# Patient Record
Sex: FEMALE | Race: WHITE | NOT HISPANIC OR LATINO | Employment: FULL TIME | URBAN - METROPOLITAN AREA
[De-identification: names, ages, dates, MRNs, and addresses within clinical notes are randomized per-mention and may not be internally consistent; named-entity substitution may affect disease eponyms.]

---

## 2017-05-23 DIAGNOSIS — I10 ESSENTIAL HYPERTENSION: ICD-10-CM

## 2017-05-23 RX ORDER — BENAZEPRIL HYDROCHLORIDE 10 MG/1
TABLET ORAL
Qty: 90 TABLET | Refills: 2 | Status: SHIPPED | OUTPATIENT
Start: 2017-05-23 | End: 2018-05-07 | Stop reason: SDUPTHER

## 2017-07-17 DIAGNOSIS — I10 ESSENTIAL HYPERTENSION: ICD-10-CM

## 2017-07-17 RX ORDER — ALENDRONATE SODIUM 70 MG/1
TABLET ORAL
Qty: 4 TABLET | Refills: 0 | Status: SHIPPED | OUTPATIENT
Start: 2017-07-17 | End: 2017-08-04 | Stop reason: SDUPTHER

## 2017-07-17 RX ORDER — BUPROPION HYDROCHLORIDE 150 MG/1
TABLET, EXTENDED RELEASE ORAL
Qty: 60 TABLET | Refills: 0 | Status: SHIPPED | OUTPATIENT
Start: 2017-07-17 | End: 2017-08-04 | Stop reason: SDUPTHER

## 2017-07-17 RX ORDER — CITALOPRAM 40 MG/1
TABLET, FILM COATED ORAL
Qty: 30 TABLET | Refills: 0 | Status: SHIPPED | OUTPATIENT
Start: 2017-07-17 | End: 2017-08-04 | Stop reason: SDUPTHER

## 2017-07-17 RX ORDER — GABAPENTIN 300 MG/1
CAPSULE ORAL
Qty: 90 CAPSULE | Refills: 0 | Status: SHIPPED | OUTPATIENT
Start: 2017-07-17 | End: 2018-11-23 | Stop reason: SDUPTHER

## 2017-08-04 ENCOUNTER — OFFICE VISIT (OUTPATIENT)
Dept: FAMILY MEDICINE | Facility: CLINIC | Age: 67
End: 2017-08-04
Payer: MEDICARE

## 2017-08-04 ENCOUNTER — LAB VISIT (OUTPATIENT)
Dept: LAB | Facility: HOSPITAL | Age: 67
End: 2017-08-04
Attending: FAMILY MEDICINE
Payer: MEDICARE

## 2017-08-04 VITALS
SYSTOLIC BLOOD PRESSURE: 118 MMHG | OXYGEN SATURATION: 98 % | TEMPERATURE: 98 F | HEART RATE: 73 BPM | HEIGHT: 62 IN | BODY MASS INDEX: 23.32 KG/M2 | RESPIRATION RATE: 12 BRPM | DIASTOLIC BLOOD PRESSURE: 62 MMHG | WEIGHT: 126.75 LBS

## 2017-08-04 DIAGNOSIS — I10 ESSENTIAL HYPERTENSION: ICD-10-CM

## 2017-08-04 DIAGNOSIS — Z12.31 ENCOUNTER FOR SCREENING MAMMOGRAM FOR BREAST CANCER: ICD-10-CM

## 2017-08-04 DIAGNOSIS — E78.5 HYPERLIPIDEMIA, UNSPECIFIED HYPERLIPIDEMIA TYPE: ICD-10-CM

## 2017-08-04 DIAGNOSIS — Z12.11 COLON CANCER SCREENING: ICD-10-CM

## 2017-08-04 DIAGNOSIS — R25.2 SPASM: ICD-10-CM

## 2017-08-04 DIAGNOSIS — M94.9 DISORDER OF BONE AND ARTICULAR CARTILAGE: ICD-10-CM

## 2017-08-04 DIAGNOSIS — F41.8 DEPRESSION WITH ANXIETY: ICD-10-CM

## 2017-08-04 DIAGNOSIS — Z00.00 WELL ADULT EXAM: Primary | ICD-10-CM

## 2017-08-04 DIAGNOSIS — M89.9 DISORDER OF BONE AND ARTICULAR CARTILAGE: ICD-10-CM

## 2017-08-04 DIAGNOSIS — Z00.00 WELL ADULT EXAM: ICD-10-CM

## 2017-08-04 LAB
ALBUMIN SERPL BCP-MCNC: 3.9 G/DL
ALP SERPL-CCNC: 77 U/L
ALT SERPL W/O P-5'-P-CCNC: 11 U/L
ANION GAP SERPL CALC-SCNC: 10 MMOL/L
AST SERPL-CCNC: 24 U/L
BILIRUB SERPL-MCNC: 0.2 MG/DL
BUN SERPL-MCNC: 14 MG/DL
CALCIUM SERPL-MCNC: 9.5 MG/DL
CHLORIDE SERPL-SCNC: 102 MMOL/L
CHOLEST/HDLC SERPL: 3.8 {RATIO}
CO2 SERPL-SCNC: 27 MMOL/L
CREAT SERPL-MCNC: 0.9 MG/DL
EST. GFR  (AFRICAN AMERICAN): >60 ML/MIN/1.73 M^2
EST. GFR  (NON AFRICAN AMERICAN): >60 ML/MIN/1.73 M^2
ESTIMATED AVG GLUCOSE: 123 MG/DL
GLUCOSE SERPL-MCNC: 105 MG/DL
HBA1C MFR BLD HPLC: 5.9 %
HDL/CHOLESTEROL RATIO: 26.1 %
HDLC SERPL-MCNC: 245 MG/DL
HDLC SERPL-MCNC: 64 MG/DL
LDLC SERPL CALC-MCNC: 150.4 MG/DL
NONHDLC SERPL-MCNC: 181 MG/DL
POTASSIUM SERPL-SCNC: 3.9 MMOL/L
PROT SERPL-MCNC: 7.4 G/DL
SODIUM SERPL-SCNC: 139 MMOL/L
TRIGL SERPL-MCNC: 153 MG/DL

## 2017-08-04 PROCEDURE — 99397 PER PM REEVAL EST PAT 65+ YR: CPT | Mod: S$GLB,,, | Performed by: FAMILY MEDICINE

## 2017-08-04 PROCEDURE — 80053 COMPREHEN METABOLIC PANEL: CPT

## 2017-08-04 PROCEDURE — 80061 LIPID PANEL: CPT

## 2017-08-04 PROCEDURE — 99999 PR PBB SHADOW E&M-EST. PATIENT-LVL IV: CPT | Mod: PBBFAC,,, | Performed by: FAMILY MEDICINE

## 2017-08-04 PROCEDURE — 83036 HEMOGLOBIN GLYCOSYLATED A1C: CPT

## 2017-08-04 PROCEDURE — 36415 COLL VENOUS BLD VENIPUNCTURE: CPT | Mod: PO

## 2017-08-04 RX ORDER — CYCLOBENZAPRINE HCL 10 MG
10 TABLET ORAL
Qty: 45 TABLET | Refills: 0 | Status: SHIPPED | OUTPATIENT
Start: 2017-08-04 | End: 2018-01-08

## 2017-08-04 RX ORDER — BUPROPION HYDROCHLORIDE 150 MG/1
150 TABLET, EXTENDED RELEASE ORAL 2 TIMES DAILY
Qty: 180 TABLET | Refills: 3 | Status: SHIPPED | OUTPATIENT
Start: 2017-08-04 | End: 2018-11-23 | Stop reason: SDUPTHER

## 2017-08-04 RX ORDER — ALENDRONATE SODIUM 70 MG/1
70 TABLET ORAL WEEKLY
Qty: 12 TABLET | Refills: 2 | Status: SHIPPED | OUTPATIENT
Start: 2017-08-04 | End: 2018-11-23 | Stop reason: SDUPTHER

## 2017-08-04 RX ORDER — CITALOPRAM 40 MG/1
TABLET, FILM COATED ORAL
Qty: 90 TABLET | Refills: 3 | Status: SHIPPED | OUTPATIENT
Start: 2017-08-04 | End: 2018-08-20 | Stop reason: SDUPTHER

## 2017-08-04 RX ORDER — ATORVASTATIN CALCIUM 40 MG/1
TABLET, FILM COATED ORAL
Qty: 90 TABLET | Refills: 3 | Status: SHIPPED | OUTPATIENT
Start: 2017-08-04 | End: 2018-11-23 | Stop reason: SDUPTHER

## 2017-08-04 NOTE — PROGRESS NOTES
Subjective:       Patient ID: Christi Coreas is a 66 y.o. female.    Chief Complaint: Annual Exam    HPI:  Here for annual exam.  Reports severe pain LLE x 3 weeks.    Review of Systems   Constitutional: Negative for appetite change, chills, diaphoresis, fatigue and fever.   HENT: Negative for hearing loss, sinus pressure and trouble swallowing.    Eyes: Negative for visual disturbance.   Respiratory: Negative for cough, chest tightness, shortness of breath and wheezing.    Cardiovascular: Negative for chest pain, palpitations and leg swelling.   Gastrointestinal: Negative for abdominal pain, blood in stool, constipation, diarrhea, nausea and vomiting.   Genitourinary: Negative for difficulty urinating, dysuria, hematuria, menstrual problem, pelvic pain and vaginal discharge.   Musculoskeletal: Negative for back pain, joint swelling and neck pain.   Skin: Negative for rash.   Neurological: Positive for numbness. Negative for dizziness, weakness and headaches.   Hematological: Negative for adenopathy. Does not bruise/bleed easily.   Psychiatric/Behavioral: Negative for dysphoric mood and sleep disturbance. The patient is not nervous/anxious.        Objective:      Physical Exam   Constitutional: She is oriented to person, place, and time. She appears well-developed and well-nourished.   Eyes: No scleral icterus.   Neck: Normal range of motion. Neck supple. No thyromegaly present.   Cardiovascular: Normal rate and regular rhythm.  Exam reveals no gallop and no friction rub.    No murmur heard.  Pulmonary/Chest: Effort normal and breath sounds normal. She has no wheezes. She has no rales.   Abdominal: Soft. Bowel sounds are normal. She exhibits no distension and no mass. There is no hepatosplenomegaly. There is no tenderness.   Musculoskeletal: She exhibits no edema.   Lymphadenopathy:     She has no cervical adenopathy.     She has no axillary adenopathy.        Right: No supraclavicular adenopathy present.         Left: No supraclavicular adenopathy present.   Neurological: She is alert and oriented to person, place, and time.   Skin: Skin is warm and dry. No rash noted.   Psychiatric: She has a normal mood and affect. Her behavior is normal.         Assessment:       1. Well adult exam    2. Hyperlipidemia, unspecified hyperlipidemia type    3. Essential hypertension    4. Spasm    5. Disorder of bone and articular cartilage    6. Encounter for screening mammogram for breast cancer    7. Depression with anxiety    8. Colon cancer screening        Plan:       Well adult exam  -     Discussed emotional healt  -     Hemoglobin A1c; Future; Expected date: 08/04/2017  -     Comprehensive metabolic panel; Future; Expected date: 08/04/2017  -     Lipid panel; Future; Expected date: 08/04/2017    Hyperlipidemia, unspecified hyperlipidemia type  -     atorvastatin (LIPITOR) 40 MG tablet; TAKE ONE TABLET BY MOUTH ONCE DAILY IN THE EVENING  Dispense: 90 tablet; Refill: 3    Essential hypertension  Continue benazepril    Spasm  -     cyclobenzaprine (FLEXERIL) 10 MG tablet; Take 1 tablet (10 mg total) by mouth as needed.  Dispense: 45 tablet; Refill: 0    Disorder of bone and articular cartilage  -     alendronate (FOSAMAX) 70 MG tablet; Take 1 tablet (70 mg total) by mouth once a week.  Dispense: 12 tablet; Refill: 2  -     DXA Bone Density Spine And Hip; Future; Expected date: 08/04/2017    Encounter for screening mammogram for breast cancer  -     Mammo Digital Screening Bilateral With CAD; Future; Expected date: 08/04/2017    Depression with anxiety  -     citalopram (CELEXA) 40 MG tablet; TAKE ONE TABLET BY MOUTH ONCE DAILY  Dispense: 90 tablet; Refill: 3  -     buPROPion (WELLBUTRIN SR) 150 MG TBSR 12 hr tablet; Take 1 tablet (150 mg total) by mouth 2 (two) times daily.  Dispense: 180 tablet; Refill: 3        Return in about 1 year (around 8/4/2018).

## 2017-08-07 ENCOUNTER — TELEPHONE (OUTPATIENT)
Dept: FAMILY MEDICINE | Facility: CLINIC | Age: 67
End: 2017-08-07

## 2017-08-07 NOTE — TELEPHONE ENCOUNTER
----- Message from Dahlia Corea MD sent at 8/5/2017 10:18 PM CDT -----  Cholesterol worse compared to 1 year ago.  Verify if patient taking atorvastatin.

## 2017-08-11 ENCOUNTER — HOSPITAL ENCOUNTER (OUTPATIENT)
Dept: RADIOLOGY | Facility: HOSPITAL | Age: 67
Discharge: HOME | End: 2017-08-11
Attending: FAMILY MEDICINE
Payer: MEDICARE

## 2017-08-11 DIAGNOSIS — Z12.31 ENCOUNTER FOR SCREENING MAMMOGRAM FOR BREAST CANCER: ICD-10-CM

## 2017-08-11 PROCEDURE — 77067 SCR MAMMO BI INCL CAD: CPT | Mod: 26,,, | Performed by: RADIOLOGY

## 2017-08-11 PROCEDURE — 77067 SCR MAMMO BI INCL CAD: CPT | Mod: TC

## 2017-08-11 PROCEDURE — 77063 BREAST TOMOSYNTHESIS BI: CPT | Mod: 26,,, | Performed by: RADIOLOGY

## 2017-08-21 ENCOUNTER — HOSPITAL ENCOUNTER (OUTPATIENT)
Dept: RADIOLOGY | Facility: CLINIC | Age: 67
Discharge: HOME | End: 2017-08-21
Attending: FAMILY MEDICINE
Payer: MEDICARE

## 2017-08-21 DIAGNOSIS — M94.9 DISORDER OF BONE AND ARTICULAR CARTILAGE: ICD-10-CM

## 2017-08-21 DIAGNOSIS — M89.9 DISORDER OF BONE AND ARTICULAR CARTILAGE: ICD-10-CM

## 2017-08-21 PROCEDURE — 77080 DXA BONE DENSITY AXIAL: CPT | Mod: TC,PO

## 2017-08-21 PROCEDURE — 77080 DXA BONE DENSITY AXIAL: CPT | Mod: 26,,, | Performed by: INTERNAL MEDICINE

## 2017-08-28 ENCOUNTER — TELEPHONE (OUTPATIENT)
Dept: FAMILY MEDICINE | Facility: CLINIC | Age: 67
End: 2017-08-28

## 2017-08-28 DIAGNOSIS — M81.0 OSTEOPOROSIS, UNSPECIFIED OSTEOPOROSIS TYPE, UNSPECIFIED PATHOLOGICAL FRACTURE PRESENCE: Primary | ICD-10-CM

## 2017-08-28 NOTE — TELEPHONE ENCOUNTER
----- Message from Leia Gaspar PA-C sent at 8/28/2017  8:03 AM CDT -----  Osteoporosis of hips and spine. Verify how long patient has been on fosamax, if over 5 years needs a drug holiday, repeat in 2 years, Recommend calcium 1200mg and Vitamin D 1,000 units daily for bone health.

## 2017-08-28 NOTE — TELEPHONE ENCOUNTER
Please verify that patient has been taking the medication as scheduled. If so recommend trying different medication (IV infusions) verify if pt ok with this

## 2017-09-01 RX ORDER — ZOLEDRONIC ACID 5 MG/100ML
5 INJECTION, SOLUTION INTRAVENOUS ONCE
Qty: 100 ML | Refills: 0 | Status: SHIPPED | OUTPATIENT
Start: 2017-09-01 | End: 2017-09-01

## 2018-01-08 ENCOUNTER — OFFICE VISIT (OUTPATIENT)
Dept: FAMILY MEDICINE | Facility: CLINIC | Age: 68
End: 2018-01-08
Payer: MEDICARE

## 2018-01-08 VITALS
HEART RATE: 81 BPM | DIASTOLIC BLOOD PRESSURE: 80 MMHG | BODY MASS INDEX: 24.26 KG/M2 | WEIGHT: 131.81 LBS | HEIGHT: 62 IN | OXYGEN SATURATION: 96 % | RESPIRATION RATE: 16 BRPM | SYSTOLIC BLOOD PRESSURE: 144 MMHG | TEMPERATURE: 98 F

## 2018-01-08 DIAGNOSIS — Z12.11 COLON CANCER SCREENING: ICD-10-CM

## 2018-01-08 DIAGNOSIS — Z23 FLU VACCINE NEED: ICD-10-CM

## 2018-01-08 DIAGNOSIS — M54.12 CERVICAL RADICULAR PAIN: Primary | ICD-10-CM

## 2018-01-08 PROCEDURE — 99214 OFFICE O/P EST MOD 30 MIN: CPT | Mod: S$GLB,,, | Performed by: PHYSICIAN ASSISTANT

## 2018-01-08 PROCEDURE — 99999 PR PBB SHADOW E&M-EST. PATIENT-LVL IV: CPT | Mod: PBBFAC,,, | Performed by: PHYSICIAN ASSISTANT

## 2018-01-08 PROCEDURE — 90662 IIV NO PRSV INCREASED AG IM: CPT | Mod: S$GLB,,, | Performed by: FAMILY MEDICINE

## 2018-01-08 PROCEDURE — G0008 ADMIN INFLUENZA VIRUS VAC: HCPCS | Mod: S$GLB,,, | Performed by: FAMILY MEDICINE

## 2018-01-08 PROCEDURE — 96372 THER/PROPH/DIAG INJ SC/IM: CPT | Mod: 59,S$GLB,, | Performed by: FAMILY MEDICINE

## 2018-01-08 RX ORDER — TIZANIDINE 4 MG/1
4 TABLET ORAL EVERY 8 HOURS
Qty: 30 TABLET | Refills: 0 | Status: SHIPPED | OUTPATIENT
Start: 2018-01-08 | End: 2018-01-18

## 2018-01-08 RX ORDER — PREDNISONE 20 MG/1
20 TABLET ORAL DAILY
Qty: 5 TABLET | Refills: 0 | Status: SHIPPED | OUTPATIENT
Start: 2018-01-09 | End: 2018-01-19

## 2018-01-08 NOTE — PROGRESS NOTES
Flu vaccine administered IM to left deltoid.VIS form given. Solu-Medrol 125 mg administered to right deltoid. Patient tolerated well. No complaints. No reaction noted.

## 2018-01-08 NOTE — PROGRESS NOTES
"Subjective:       Patient ID: Christi Coreas is a 67 y.o. female.    Chief Complaint: Shoulder Pain (neck pain radiate to right shoulder x2 weeks )    Neck Pain    This is a new problem. The current episode started 1 to 4 weeks ago (2 weeks). The problem occurs constantly. The pain is associated with nothing. The pain is present in the right side. The quality of the pain is described as aching. The pain is severe. The symptoms are aggravated by bending and position. Associated symptoms include numbness. Pertinent negatives include no fever, headaches or tingling. She has tried muscle relaxants and heat for the symptoms. The treatment provided mild relief.     Review of patient's allergies indicates:   Allergen Reactions    Aspirin     Nexium [esomeprazole magnesium] Other (See Comments)     "bad pain"       Review of Systems   Constitutional: Negative for fever.   Musculoskeletal: Positive for arthralgias and neck pain. Negative for joint swelling.   Neurological: Positive for numbness. Negative for tingling and headaches.       Objective:      Physical Exam   Constitutional: She appears well-developed and well-nourished. No distress.   HENT:   Head: Normocephalic and atraumatic.   Neck: Muscular tenderness present. No spinous process tenderness present. Decreased range of motion present.   Musculoskeletal:        Right shoulder: She exhibits decreased range of motion and tenderness. She exhibits no bony tenderness.        Right hand: Normal strength noted.        Left hand: Normal strength noted.   Skin: Skin is warm and dry. She is not diaphoretic.   Psychiatric: She has a normal mood and affect. Her behavior is normal.   Vitals reviewed.      Assessment:       1. Cervical radicular pain    2. Flu vaccine need    3. Colon cancer screening        Plan:         Christi was seen today for shoulder pain.    Diagnoses and all orders for this visit:    Cervical radicular pain  -     methylPREDNISolone sod suc(PF) " injection 125 mg; Inject 125 mg into the muscle once.  -     predniSONE (DELTASONE) 20 MG tablet; Take 1 tablet (20 mg total) by mouth once daily.  -     tiZANidine (ZANAFLEX) 4 MG tablet; Take 1 tablet (4 mg total) by mouth every 8 (eight) hours.  -     ROM exercises given, advised her to call Thursday if there is no change or worsening   -    MRI from 2011 reviewed by me. Spinal canal stenosis if no change by Thursday may need Repeat MRI    Flu vaccine need  -     Influenza - High Dose (65+) (PF) (IM)    Colon cancer screening  -     Case request GI: COLONOSCOPY

## 2018-05-04 ENCOUNTER — OFFICE VISIT (OUTPATIENT)
Dept: FAMILY MEDICINE | Facility: CLINIC | Age: 68
End: 2018-05-04
Payer: MEDICARE

## 2018-05-04 VITALS
TEMPERATURE: 98 F | WEIGHT: 132.25 LBS | BODY MASS INDEX: 24.34 KG/M2 | HEART RATE: 85 BPM | OXYGEN SATURATION: 96 % | DIASTOLIC BLOOD PRESSURE: 70 MMHG | SYSTOLIC BLOOD PRESSURE: 114 MMHG | HEIGHT: 62 IN

## 2018-05-04 DIAGNOSIS — J40 BRONCHITIS: Primary | ICD-10-CM

## 2018-05-04 PROCEDURE — 99214 OFFICE O/P EST MOD 30 MIN: CPT | Mod: 25,S$GLB,, | Performed by: NURSE PRACTITIONER

## 2018-05-04 PROCEDURE — 99999 PR PBB SHADOW E&M-EST. PATIENT-LVL III: CPT | Mod: PBBFAC,,, | Performed by: NURSE PRACTITIONER

## 2018-05-04 PROCEDURE — 3008F BODY MASS INDEX DOCD: CPT | Mod: S$GLB,,, | Performed by: NURSE PRACTITIONER

## 2018-05-04 PROCEDURE — 3074F SYST BP LT 130 MM HG: CPT | Mod: S$GLB,,, | Performed by: NURSE PRACTITIONER

## 2018-05-04 PROCEDURE — 96372 THER/PROPH/DIAG INJ SC/IM: CPT | Mod: S$GLB,,, | Performed by: NURSE PRACTITIONER

## 2018-05-04 PROCEDURE — 3078F DIAST BP <80 MM HG: CPT | Mod: S$GLB,,, | Performed by: NURSE PRACTITIONER

## 2018-05-04 RX ORDER — PROMETHAZINE HYDROCHLORIDE AND DEXTROMETHORPHAN HYDROBROMIDE 6.25; 15 MG/5ML; MG/5ML
5 SYRUP ORAL
Qty: 180 ML | Refills: 0 | Status: SHIPPED | OUTPATIENT
Start: 2018-05-04 | End: 2018-05-14

## 2018-05-04 RX ORDER — LEVOCETIRIZINE DIHYDROCHLORIDE 5 MG/1
5 TABLET, FILM COATED ORAL NIGHTLY
Qty: 30 TABLET | Refills: 0 | Status: SHIPPED | OUTPATIENT
Start: 2018-05-04 | End: 2020-10-15 | Stop reason: SDUPTHER

## 2018-05-04 RX ORDER — DEXAMETHASONE SODIUM PHOSPHATE 4 MG/ML
8 INJECTION, SOLUTION INTRA-ARTICULAR; INTRALESIONAL; INTRAMUSCULAR; INTRAVENOUS; SOFT TISSUE
Status: COMPLETED | OUTPATIENT
Start: 2018-05-04 | End: 2018-05-04

## 2018-05-04 RX ADMIN — DEXAMETHASONE SODIUM PHOSPHATE 8 MG: 4 INJECTION, SOLUTION INTRA-ARTICULAR; INTRALESIONAL; INTRAMUSCULAR; INTRAVENOUS; SOFT TISSUE at 11:05

## 2018-05-04 NOTE — PATIENT INSTRUCTIONS
What Is Acute Bronchitis?  Acute bronchitis is when the airways in your lungs (bronchial tubes) become red and swollen (inflamed). It is usually caused by a viral infection. But it can also occur because of a bacteria or allergen. Symptoms include a cough that produces yellow or greenish mucus and can last for days or sometimes weeks.  Inside healthy lungs    Air travels in and out of the lungs through the airways. The linings of these airways produce sticky mucus. This mucus traps particles that enter the lungs. Tiny structures called cilia then sweep the particles out of the airways.     Healthy airway: Airways are normally open. Air moves in and out easily.      Healthy cilia: Tiny, hairlike cilia sweep mucus and particles up and out of the airways.   Lungs with bronchitis  Bronchitis often occurs with a cold or the flu virus. The airways become inflamed (red and swollen). There is a deep hacking cough from the extra mucus. Other symptoms may include:  · Wheezing  · Chest discomfort  · Shortness of breath  · Mild fever  A second infection, this time due to bacteria, may then occur. And airways irritated by allergens or smoke are more likely to get infected.        Inflamed airway: Inflammation and extra mucus narrow the airway, causing shortness of breath.      Impaired cilia: Extra mucus impairs cilia, causing congestion and wheezing. Smoking makes the problem worse.   Making a diagnosis  A physical exam, health history, and certain tests help your healthcare provider make the diagnosis.  Health history  Your healthcare provider will ask you about your symptoms.  The exam  Your provider listens to your chest for signs of congestion. He or she may also check your ears, nose, and throat.  Possible tests  · A sputum test for bacteria. This requires a sample of mucus from your lungs.  · A nasal or throat swab. This tests to see if you have a bacterial infection.  · A chest X-ray. This is done if your  healthcare provider thinks you have pneumonia.  · Tests to check for an underlying condition. Other tests may be done to check for things such as allergies, asthma, or COPD (chronic obstructive pulmonary disease). You may need to see a specialist for more lung function testing.  Treating a cough  The main treatment for bronchitis is easing symptoms. Avoiding smoke, allergens, and other things that trigger coughing can often help. If the infection is bacterial, you may be given antibiotics. During the illness, it's important to get plenty of sleep. To ease symptoms:  · Dont smoke. Also avoid secondhand smoke.  · Use a humidifier. Or try breathing in steam from a hot shower. This may help loosen mucus.  · Drink a lot of water and juice. They can soothe the throat and may help thin mucus.  · Sit up or use extra pillows when in bed. This helps to lessen coughing and congestion.  · Ask your provider about using medicine. Ask about using cough medicine, pain and fever medicine, or a decongestant.  Antibiotics  Most cases of bronchitis are caused by cold or flu viruses. They dont need antibiotics to treat them, even if your mucus is thick and green or yellow. Antibiotics dont treat viral illness and antibiotics have not been shown to have any benefit in cases of acute bronchitis. Taking antibiotics when they are not needed increases your risk of getting an infection later that is antibiotic-resistant. Antibiotics can also cause severe cases of diarrhea that require other antibiotics to treat.  It is important that you accept your healthcare provider's opinion to not use antibiotics. Your provider will prescribe antibiotics if the infection is caused by bacteria. If they are prescribed:  · Take all of the medicine. Take the medicine until it is used up, even if symptoms have improved. If you dont, the bronchitis may come back.  · Take the medicines as directed. For instance, some medicines should be taken with  food.  · Ask about side effects. Ask your provider or pharmacist what side effects are common, and what to do about them.  Follow-up care  You should see your provider again in 2 to 3 weeks. By this time, symptoms should have improved. An infection that lasts longer may mean you have a more serious problem.  Prevention  · Avoid tobacco smoke. If you smoke, quit. Stay away from smoky places. Ask friends and family not to smoke around you, or in your home or car.  · Get checked for allergies.  · Ask your provider about getting a yearly flu shot. Also ask about pneumococcal or pneumonia shots.  · Wash your hands often. This helps reduce the chance of picking up viruses that cause colds and flu.  Call your healthcare provider if:  · Symptoms worsen, or you have new symptoms  · Breathing problems worsen or  become severe  · Symptoms dont get better within a week, or within 3 days of taking antibiotics   Date Last Reviewed: 2/1/2017  © 1629-7443 [employer]. [address] [place] [address], [place] [address]. All rights reserved. This information is not intended as a substitute for professional medical care. Always follow your healthcare professional's instructions.        Acute Bronchitis  Your healthcare provider has told you that you have acute bronchitis. Bronchitis is infection or inflammation of the bronchial tubes (airways in the lungs). Normally, air moves easily in and out of the airways. Bronchitis narrows the airways, making it harder for air to flow in and out of the lungs. This causes symptoms such as shortness of breath, coughing up yellow or green mucus, and wheezing. Bronchitis can be acute or chronic. Acute means the condition comes on quickly and goes away in a short time, usually within 3 to 10 days. Chronic means a condition lasts a long time and often comes back.    What causes acute bronchitis?  Acute bronchitis almost always starts as a viral respiratory infection, such as a cold or the flu.  Certain factors make it more likely for a cold or flu to turn into bronchitis. These include being very young, being elderly, having a heart or lung problem, or having a weak immune system. Cigarette smoking also makes bronchitis more likely.  When bronchitis develops, the airways become swollen. The airways may also become infected with bacteria. This is known as a secondary infection.  Diagnosing acute bronchitis  Your healthcare provider will examine you and ask about your symptoms and health history. You may also have a sputum culture to test the fluid in your lungs. Chest X-rays may be done to look for infection in the lungs.  Treating acute bronchitis  Bronchitis usually clears up as the cold or flu goes away. You can help feel better faster by doing the following:  · Take medicine as directed. You may be told to take ibuprofen or other over-the-counter medicines. These help relieve inflammation in your bronchial tubes. Your healthcare provider may prescribe an inhaler to help open up the bronchial tubes. Most of the time, acute bronchitis is caused by a viral infection. Antibiotics are usually not prescribed for viral infections.  · Drink plenty of fluids, such as water, juice, or warm soup. Fluids loosen mucus so that you can cough it up. This helps you breathe more easily. Fluids also prevent dehydration.  · Make sure you get plenty of rest.  · Do not smoke. Do not allow anyone else to smoke in your home.  Recovery and follow-up  Follow up with your doctor as you are told. You will likely feel better in a week or two. But a dry cough can linger beyond that time. Let your doctor know if you still have symptoms (other than a dry cough) after 2 weeks, or if youre prone to getting bronchial infections. Take steps to protect yourself from future infections. These steps include stopping smoking and avoiding tobacco smoke, washing your hands often, and getting a yearly flu shot.  When to call your healthcare  provider  Call the healthcare provider if you have any of the following:  · Fever of 100.4°F (38.0°C) or higher, or as advised  · Symptoms that get worse, or new symptoms  · Trouble breathing  · Symptoms that dont start to improve within a week, or within 3 days of taking antibiotics   Date Last Reviewed: 12/1/2016  © 8374-3659 [employer]. [address] [place] [address], [place] [address]. All rights reserved. This information is not intended as a substitute for professional medical care. Always follow your healthcare professional's instructions.

## 2018-05-04 NOTE — PROGRESS NOTES
"Subjective:       Patient ID: Christi Coreas is a 67 y.o. female.    Chief Complaint: URI (pt states cough X 4 weeks, back pain )    URI    This is a recurrent problem. The current episode started 1 to 4 weeks ago (about 4 weeks). The problem has been gradually worsening. There has been no fever. Associated symptoms include congestion, coughing, rhinorrhea, sneezing and a sore throat. Pertinent negatives include no ear pain, headaches or sinus pain. Associated symptoms comments: Cough worse with lying. Treatments tried: allegra. The treatment provided mild relief.       Past Medical History:   Diagnosis Date    Degenerative disc disease     lumbar    Glucose intolerance (impaired glucose tolerance)     Hypertension        Social History     Social History    Marital status:      Spouse name: N/A    Number of children: N/A    Years of education: N/A     Occupational History    Not on file.     Social History Main Topics    Smoking status: Never Smoker    Smokeless tobacco: Never Used    Alcohol use Yes      Comment: occacionally wine    Drug use: No    Sexual activity: Not on file     Other Topics Concern    Not on file     Social History Narrative    No narrative on file       Past Surgical History:   Procedure Laterality Date    APPENDECTOMY      HYSTERECTOMY      OVARIAN CYST SURGERY         Review of Systems   Constitutional: Negative for chills and fever.   HENT: Positive for congestion, postnasal drip, rhinorrhea, sneezing and sore throat. Negative for ear pain, sinus pain and sinus pressure.    Respiratory: Positive for cough.    Neurological: Negative for headaches.   All other systems reviewed and are negative.      Objective:   /70 (BP Location: Right arm, Patient Position: Sitting, BP Method: Medium (Manual))   Pulse 85   Temp 97.9 °F (36.6 °C) (Oral)   Ht 5' 2" (1.575 m)   Wt 60 kg (132 lb 4.4 oz)   SpO2 96%   BMI 24.19 kg/m²      Physical Exam   Constitutional: She is " oriented to person, place, and time. She appears well-developed and well-nourished. She is cooperative.   HENT:   Head: Normocephalic and atraumatic.   Right Ear: Hearing, tympanic membrane, external ear and ear canal normal.   Left Ear: Hearing, external ear and ear canal normal. A middle ear effusion is present.   Nose: No rhinorrhea.   Mouth/Throat: Uvula is midline and mucous membranes are normal. Posterior oropharyngeal erythema present. No oropharyngeal exudate or posterior oropharyngeal edema.   +PND   Cardiovascular: Normal rate and regular rhythm.    Pulmonary/Chest: Effort normal and breath sounds normal. No respiratory distress. She has no decreased breath sounds. She has no wheezes. She has no rhonchi. She has no rales.   Lymphadenopathy:     She has no cervical adenopathy.   Neurological: She is alert and oriented to person, place, and time.   Skin: Skin is warm, dry and intact. She is not diaphoretic. No pallor.   Psychiatric: She has a normal mood and affect. Her speech is normal and behavior is normal.   Vitals reviewed.      Assessment:       1. Bronchitis        Plan:       Christi was seen today for uri.    Diagnoses and all orders for this visit:    Bronchitis  -     dexamethasone injection 8 mg; Inject 2 mLs (8 mg total) into the muscle one time.  -     levocetirizine (XYZAL) 5 MG tablet; Take 1 tablet (5 mg total) by mouth every evening.  -     promethazine-dextromethorphan (PROMETHAZINE-DM) 6.25-15 mg/5 mL Syrp; Take 5 mLs by mouth every 4 to 6 hours as needed.      Follow-up if symptoms worsen or fail to improve.

## 2018-05-07 DIAGNOSIS — I10 ESSENTIAL HYPERTENSION: ICD-10-CM

## 2018-05-07 RX ORDER — BENAZEPRIL HYDROCHLORIDE 10 MG/1
TABLET ORAL
Qty: 90 TABLET | Refills: 1 | Status: SHIPPED | OUTPATIENT
Start: 2018-05-07 | End: 2018-11-23 | Stop reason: SDUPTHER

## 2018-08-08 ENCOUNTER — TELEPHONE (OUTPATIENT)
Dept: ENDOSCOPY | Facility: HOSPITAL | Age: 68
End: 2018-08-08

## 2018-08-20 DIAGNOSIS — F41.8 DEPRESSION WITH ANXIETY: ICD-10-CM

## 2018-08-20 RX ORDER — CITALOPRAM 40 MG/1
TABLET, FILM COATED ORAL
Qty: 90 TABLET | Refills: 0 | Status: SHIPPED | OUTPATIENT
Start: 2018-08-20 | End: 2018-11-23 | Stop reason: SDUPTHER

## 2018-11-02 ENCOUNTER — OFFICE VISIT (OUTPATIENT)
Dept: FAMILY MEDICINE | Facility: CLINIC | Age: 68
End: 2018-11-02
Payer: MEDICARE

## 2018-11-02 VITALS
OXYGEN SATURATION: 97 % | HEART RATE: 75 BPM | TEMPERATURE: 98 F | HEIGHT: 62 IN | BODY MASS INDEX: 23.49 KG/M2 | SYSTOLIC BLOOD PRESSURE: 140 MMHG | DIASTOLIC BLOOD PRESSURE: 82 MMHG | WEIGHT: 127.63 LBS

## 2018-11-02 DIAGNOSIS — M51.36 DDD (DEGENERATIVE DISC DISEASE), LUMBAR: ICD-10-CM

## 2018-11-02 DIAGNOSIS — K13.0 ABSCESS OF LIP: Primary | ICD-10-CM

## 2018-11-02 DIAGNOSIS — Z23 NEED FOR PROPHYLACTIC VACCINATION AND INOCULATION AGAINST INFLUENZA: ICD-10-CM

## 2018-11-02 DIAGNOSIS — I10 ESSENTIAL HYPERTENSION, BENIGN: ICD-10-CM

## 2018-11-02 PROCEDURE — 3077F SYST BP >= 140 MM HG: CPT | Mod: S$GLB,,, | Performed by: FAMILY MEDICINE

## 2018-11-02 PROCEDURE — G0008 ADMIN INFLUENZA VIRUS VAC: HCPCS | Mod: S$GLB,,, | Performed by: FAMILY MEDICINE

## 2018-11-02 PROCEDURE — 3079F DIAST BP 80-89 MM HG: CPT | Mod: S$GLB,,, | Performed by: FAMILY MEDICINE

## 2018-11-02 PROCEDURE — 90662 IIV NO PRSV INCREASED AG IM: CPT | Mod: S$GLB,,, | Performed by: FAMILY MEDICINE

## 2018-11-02 PROCEDURE — 99999 PR PBB SHADOW E&M-EST. PATIENT-LVL IV: CPT | Mod: PBBFAC,,, | Performed by: FAMILY MEDICINE

## 2018-11-02 PROCEDURE — 1101F PT FALLS ASSESS-DOCD LE1/YR: CPT | Mod: S$GLB,,, | Performed by: FAMILY MEDICINE

## 2018-11-02 PROCEDURE — 99214 OFFICE O/P EST MOD 30 MIN: CPT | Mod: 25,S$GLB,, | Performed by: FAMILY MEDICINE

## 2018-11-02 RX ORDER — AMOXICILLIN AND CLAVULANATE POTASSIUM 875; 125 MG/1; MG/1
1 TABLET, FILM COATED ORAL 2 TIMES DAILY
Qty: 20 TABLET | Refills: 0 | Status: SHIPPED | OUTPATIENT
Start: 2018-11-02 | End: 2018-11-23 | Stop reason: ALTCHOICE

## 2018-11-02 NOTE — PROGRESS NOTES
Routine Office Visit    Patient Name: Christi Coreas    : 1950  MRN: [id number]    Subjective:  Christi is a 67 y.o. female who presents today for     1. Fall on street near home - occurred 1 week ago - pt was walking and tripped and fell forward and hit her knee, hand and lip on her left side. She did not lose conciousness. Pt was able to get up and walk home. Pt did not notice bleeding other than her mouth. She was here to evaluate pain and swelling of her lip. There is swelling and redness on her upper lip, especially on the left side. She developed and and oozing yellow purulent material on her lip yesterday. Pt has been cleaning her lip with peroxide and using antibiotic salve.     Review of Systems   Constitutional: Negative for chills and fever.   HENT: Negative for congestion.    Eyes: Negative for blurred vision.   Respiratory: Negative for cough.    Cardiovascular: Negative for chest pain.   Gastrointestinal: Negative for abdominal pain, constipation, diarrhea, heartburn, nausea and vomiting.   Genitourinary: Negative for dysuria.   Musculoskeletal: Negative for myalgias.   Skin: Negative for itching and rash.   Neurological: Negative for dizziness and headaches.   Psychiatric/Behavioral: Negative for depression.       Active Problem List  Patient Active Problem List   Diagnosis    DDD (degenerative disc disease), cervical    Anxiety    Essential hypertension, benign    Glucose intolerance (impaired glucose tolerance)    Degenerative disc disease    DDD (degenerative disc disease), lumbar    GERD (gastroesophageal reflux disease)    LUQ abdominal pain       Past Surgical History  Past Surgical History:   Procedure Laterality Date    APPENDECTOMY      ESOPHAGOGASTRODUODENOSCOPY (EGD) N/A 2015    Performed by [facility] at [facility] ENDO (4TH FLR)    HYSTERECTOMY      INJECTION, STEROID, SPINE, LUMBAR, EPIDURAL N/A 2014    Per[facility]berg [facility] at [facility] PAIN MGT     OVARIAN CYST SURGERY         Family History  Family History   Problem Relation Age of Onset    Cancer Mother     Hypertension Father     Cancer Sister     Diabetes Sister     Breast cancer Sister     Breast cancer Maternal Aunt        Social History  Social History     Socioeconomic History    Marital status:      Spouse name: Not on file    Number of children: Not on file    Years of education: Not on file    Highest education level: Not on file   Social Needs    Financial resource strain: Not on file    Food insecurity - worry: Not on file    Food insecurity - inability: Not on file    Transportation needs - medical: Not on file    Transportation needs - non-medical: Not on file   Occupational History    Not on file   Tobacco Use    Smoking status: Never Smoker    Smokeless tobacco: Never Used   Substance and Sexual Activity    Alcohol use: Yes     Comment: occacionally wine    Drug use: No    Sexual activity: Not on file   Other Topics Concern    Not on file   Social History Narrative    Not on file       Medications and Allergies  Reviewed and updated.   Current Outpatient Medications   Medication Sig    alendronate (FOSAMAX) 70 MG tablet Take 1 tablet (70 mg total) by mouth once a week.    atorvastatin (LIPITOR) 40 MG tablet TAKE ONE TABLET BY MOUTH ONCE DAILY IN THE EVENING    benazepril (LOTENSIN) 10 MG tablet TAKE ONE TABLET BY MOUTH ONCE DAILY .  NO  FURTHER  REFILLS  UNTIL  SEEN  BY  DOCTOR.    buPROPion (WELLBUTRIN SR) 150 MG TBSR 12 hr tablet Take 1 tablet (150 mg total) by mouth 2 (two) times daily.    citalopram (CELEXA) 40 MG tablet TAKE ONE TABLET BY MOUTH ONCE DAILY    gabapentin (NEURONTIN) 300 MG capsule TAKE ONE CAPSULE BY MOUTH THREE TIMES DAILY    hydrOXYzine HCl (ATARAX) 25 MG tablet Take 1 tablet (25 mg total) by mouth 3 (three) times daily as needed for Itching.    levocetirizine (XYZAL) 5 MG tablet Take 1 tablet (5 mg total) by mouth every evening.     "omeprazole (PRILOSEC) 20 MG capsule Take 2 capsules (40 mg total) by mouth once daily.    amoxicillin-clavulanate 875-125mg (AUGMENTIN) 875-125 mg per tablet Take 1 tablet by mouth 2 (two) times daily.     No current facility-administered medications for this visit.        Physical Exam  BP (!) 140/82 (BP Location: Left arm, Patient Position: Sitting, BP Method: Medium (Manual))   Pulse 75   Temp 98.3 °F (36.8 °C) (Oral)   Ht 5' 2" (1.575 m)   Wt 57.9 kg (127 lb 10.3 oz)   SpO2 97%   BMI 23.35 kg/m²   Physical Exam   Constitutional: She is oriented to person, place, and time. She appears well-developed and well-nourished.   HENT:   Head: Normocephalic and atraumatic.   Mouth/Throat: Oropharynx is clear and moist and mucous membranes are normal.   Upper lip swelling.  Area of induration of left upper lip associated with warm and redness. No drainage at this time.    Eyes: Conjunctivae and EOM are normal. Pupils are equal, round, and reactive to light.   Neck: Normal range of motion. Neck supple.   Cardiovascular: Normal rate, regular rhythm and normal heart sounds. Exam reveals no gallop and no friction rub.   No murmur heard.  Pulmonary/Chest: Breath sounds normal. No respiratory distress.   Abdominal: Soft. Bowel sounds are normal. She exhibits no distension. There is no tenderness.   Musculoskeletal: Normal range of motion.   Lymphadenopathy:     She has no cervical adenopathy.   Neurological: She is alert and oriented to person, place, and time.   Skin: Skin is warm.   Psychiatric: She has a normal mood and affect.         Assessment/Plan:  Christi Coreas is a 67 y.o. female who presents today for :    Problem List Items Addressed This Visit        Neuro    DDD (degenerative disc disease), lumbar  Noted in chart  Continue to monitor        Cardiac/Vascular    Essential hypertension, benign  The current medical regimen is effective;  continue present plan and medications.  Continue f/u with pcp       "   Other Visit Diagnoses     Abscess of lip    -  Primary    Relevant Medications    amoxicillin-clavulanate 875-125mg (AUGMENTIN) 875-125 mg per tablet  Recommend to complete course of antibioics  Common side effects of this medication were discussed with the patient. Questions regarding medications were discussed during this visit.   Recommend to use warm compresses  Recommend to go to ER / Urgent Care if symptoms worsen / progress especially over the weekend       Need for prophylactic vaccination and inoculation against influenza        Relevant Orders    Influenza - High Dose (65+) (PF) (IM) (Completed)            Follow-up if symptoms worsen or fail to improve.

## 2018-11-02 NOTE — PROGRESS NOTES
Injection given, tolerated well, verbalized understanding to wait 15 min after, notify staff with any concerns.       (3) adequate

## 2018-11-02 NOTE — PATIENT INSTRUCTIONS
Abrasions  Abrasions are skin scrapes. Their treatment depends on how large and deep the abrasion is.  Home care  You may be prescribed an antibiotic cream or ointment to apply to the wound. This helps prevent infection. Follow instructions when using this medicine.  General care  · To care for the abrasion, do the following each day for as long as directed by your healthcare provider.  ¨ If you were given a bandage, change it once a day. If your bandage sticks to the wound, soak it in warm water until it loosens.  ¨ Wash the area with soap and warm water. You may do this in a sink or under a tub faucet or shower. Rinse off the soap. Then pat the area dry with a clean towel.  ¨ If antibiotic ointment or cream was prescribed, reapply it to the wound as directed. Cover the wound with a fresh nonstick bandage. If the bandage becomes wet or dirty, change it as soon as possible.  ¨ Some antibiotic ointments or cream can cause an allergic reaction or dermatitis. This may cause redness, itching and or hives. If this occurs, stop using the ointment immediately and wash off any remaining ointment. You may need to take some allergy medicine to relieve symptoms.  · You may use acetaminophen or ibuprofen to control pain unless another pain medicine was prescribed. Talk with your healthcare provider before using these medicines if you have chronic liver or kidney disease or ever had a stomach ulcer or GI bleeding. Dont use ibuprofen in children younger than six months old.  · Most skin wounds heal within 10 days. But an infection may occur even with treatment. So its important to watch the wound for signs of infection as listed below.  Follow-up care  Follow up with your healthcare provider, or as advised.  When to seek medical advice  Call your healthcare provider right away if any of these occur:  · Fever of 100.4ºF (38ºC) or higher, or as directed by your healthcare provider  · Increasing pain, redness, swelling, or  drainage from the wound  · Bleeding from the wound that does not stop after a few minutes of steady, firm pressure  · Decreased ability to move any body part near the wound  Date Last Reviewed: 3/3/2017  © 2563-7409 [employer]. [address] [place] [address], [place] [address]. All rights reserved. This information is not intended as a substitute for professional medical care. Always follow your healthcare professional's instructions.

## 2018-11-09 ENCOUNTER — TELEPHONE (OUTPATIENT)
Dept: FAMILY MEDICINE | Facility: CLINIC | Age: 68
End: 2018-11-09

## 2018-11-09 DIAGNOSIS — I10 ESSENTIAL HYPERTENSION, BENIGN: Primary | ICD-10-CM

## 2018-11-09 DIAGNOSIS — R73.03 PRE-DIABETES: ICD-10-CM

## 2018-11-09 NOTE — TELEPHONE ENCOUNTER
Lipid and cmp ordered per [facility];  Pt had hgba1c of 5.9 last year, not on any DM meds, do you want to repeat

## 2018-11-15 ENCOUNTER — LAB VISIT (OUTPATIENT)
Dept: LAB | Facility: HOSPITAL | Age: 68
End: 2018-11-15
Attending: FAMILY MEDICINE
Payer: MEDICARE

## 2018-11-15 DIAGNOSIS — R73.03 PRE-DIABETES: ICD-10-CM

## 2018-11-15 DIAGNOSIS — I10 ESSENTIAL HYPERTENSION, BENIGN: ICD-10-CM

## 2018-11-15 LAB
ALBUMIN SERPL BCP-MCNC: 3.9 G/DL
ALP SERPL-CCNC: 75 U/L
ALT SERPL W/O P-5'-P-CCNC: 15 U/L
ANION GAP SERPL CALC-SCNC: 8 MMOL/L
AST SERPL-CCNC: 26 U/L
BILIRUB SERPL-MCNC: 0.3 MG/DL
BUN SERPL-MCNC: 13 MG/DL
CALCIUM SERPL-MCNC: 9.6 MG/DL
CHLORIDE SERPL-SCNC: 105 MMOL/L
CHOLEST SERPL-MCNC: 184 MG/DL
CHOLEST/HDLC SERPL: 3 {RATIO}
CO2 SERPL-SCNC: 29 MMOL/L
CREAT SERPL-MCNC: 0.8 MG/DL
EST. GFR  (AFRICAN AMERICAN): >60 ML/MIN/1.73 M^2
EST. GFR  (NON AFRICAN AMERICAN): >60 ML/MIN/1.73 M^2
ESTIMATED AVG GLUCOSE: 117 MG/DL
GLUCOSE SERPL-MCNC: 149 MG/DL
HBA1C MFR BLD HPLC: 5.7 %
HDLC SERPL-MCNC: 61 MG/DL
HDLC SERPL: 33.2 %
LDLC SERPL CALC-MCNC: 107.4 MG/DL
NONHDLC SERPL-MCNC: 123 MG/DL
POTASSIUM SERPL-SCNC: 3.8 MMOL/L
PROT SERPL-MCNC: 6.9 G/DL
SODIUM SERPL-SCNC: 142 MMOL/L
TRIGL SERPL-MCNC: 78 MG/DL

## 2018-11-15 PROCEDURE — 80053 COMPREHEN METABOLIC PANEL: CPT

## 2018-11-15 PROCEDURE — 83036 HEMOGLOBIN GLYCOSYLATED A1C: CPT

## 2018-11-15 PROCEDURE — 36415 COLL VENOUS BLD VENIPUNCTURE: CPT | Mod: PO

## 2018-11-15 PROCEDURE — 80061 LIPID PANEL: CPT

## 2018-11-23 ENCOUNTER — OFFICE VISIT (OUTPATIENT)
Dept: FAMILY MEDICINE | Facility: CLINIC | Age: 68
End: 2018-11-23
Payer: MEDICARE

## 2018-11-23 ENCOUNTER — TELEPHONE (OUTPATIENT)
Dept: FAMILY MEDICINE | Facility: CLINIC | Age: 68
End: 2018-11-23

## 2018-11-23 VITALS
DIASTOLIC BLOOD PRESSURE: 82 MMHG | SYSTOLIC BLOOD PRESSURE: 144 MMHG | TEMPERATURE: 99 F | RESPIRATION RATE: 16 BRPM | HEART RATE: 74 BPM | OXYGEN SATURATION: 97 % | HEIGHT: 62 IN | WEIGHT: 128.5 LBS | BODY MASS INDEX: 23.65 KG/M2

## 2018-11-23 DIAGNOSIS — L28.2 PRURITIC RASH: ICD-10-CM

## 2018-11-23 DIAGNOSIS — F41.8 DEPRESSION WITH ANXIETY: ICD-10-CM

## 2018-11-23 DIAGNOSIS — M25.552 PAIN OF BOTH HIP JOINTS: ICD-10-CM

## 2018-11-23 DIAGNOSIS — M89.9 DISORDER OF BONE AND ARTICULAR CARTILAGE: ICD-10-CM

## 2018-11-23 DIAGNOSIS — I10 ESSENTIAL HYPERTENSION: ICD-10-CM

## 2018-11-23 DIAGNOSIS — E78.5 HYPERLIPIDEMIA, UNSPECIFIED HYPERLIPIDEMIA TYPE: ICD-10-CM

## 2018-11-23 DIAGNOSIS — Z12.11 COLON CANCER SCREENING: Primary | ICD-10-CM

## 2018-11-23 DIAGNOSIS — M25.551 PAIN OF BOTH HIP JOINTS: ICD-10-CM

## 2018-11-23 DIAGNOSIS — M94.9 DISORDER OF BONE AND ARTICULAR CARTILAGE: ICD-10-CM

## 2018-11-23 DIAGNOSIS — R73.09 ELEVATED GLUCOSE: Primary | ICD-10-CM

## 2018-11-23 DIAGNOSIS — K21.9 GERD (GASTROESOPHAGEAL REFLUX DISEASE): ICD-10-CM

## 2018-11-23 PROCEDURE — 3077F SYST BP >= 140 MM HG: CPT | Mod: S$GLB,,, | Performed by: FAMILY MEDICINE

## 2018-11-23 PROCEDURE — 1101F PT FALLS ASSESS-DOCD LE1/YR: CPT | Mod: S$GLB,,, | Performed by: FAMILY MEDICINE

## 2018-11-23 PROCEDURE — 3079F DIAST BP 80-89 MM HG: CPT | Mod: S$GLB,,, | Performed by: FAMILY MEDICINE

## 2018-11-23 PROCEDURE — 99214 OFFICE O/P EST MOD 30 MIN: CPT | Mod: S$GLB,,, | Performed by: FAMILY MEDICINE

## 2018-11-23 PROCEDURE — 99999 PR PBB SHADOW E&M-EST. PATIENT-LVL III: CPT | Mod: PBBFAC,,, | Performed by: FAMILY MEDICINE

## 2018-11-23 RX ORDER — GABAPENTIN 300 MG/1
300 CAPSULE ORAL 3 TIMES DAILY
Qty: 270 CAPSULE | Refills: 3 | Status: SHIPPED | OUTPATIENT
Start: 2018-11-23 | End: 2020-03-04

## 2018-11-23 RX ORDER — ATORVASTATIN CALCIUM 40 MG/1
TABLET, FILM COATED ORAL
Qty: 90 TABLET | Refills: 3 | Status: SHIPPED | OUTPATIENT
Start: 2018-11-23 | End: 2019-12-03 | Stop reason: SDUPTHER

## 2018-11-23 RX ORDER — BENAZEPRIL HYDROCHLORIDE 10 MG/1
TABLET ORAL
Qty: 90 TABLET | Refills: 3 | Status: SHIPPED | OUTPATIENT
Start: 2018-11-23 | End: 2018-11-23

## 2018-11-23 RX ORDER — HYDROXYZINE HYDROCHLORIDE 25 MG/1
25 TABLET, FILM COATED ORAL 3 TIMES DAILY PRN
Qty: 30 TABLET | Refills: 3 | Status: SHIPPED | OUTPATIENT
Start: 2018-11-23 | End: 2024-03-28

## 2018-11-23 RX ORDER — LEVOCETIRIZINE DIHYDROCHLORIDE 5 MG/1
5 TABLET, FILM COATED ORAL NIGHTLY
Qty: 30 TABLET | Refills: 0 | Status: CANCELLED | OUTPATIENT
Start: 2018-11-23 | End: 2019-11-23

## 2018-11-23 RX ORDER — ALENDRONATE SODIUM 70 MG/1
70 TABLET ORAL WEEKLY
Qty: 12 TABLET | Refills: 3 | Status: SHIPPED | OUTPATIENT
Start: 2018-11-23 | End: 2020-03-04

## 2018-11-23 RX ORDER — OMEPRAZOLE 20 MG/1
40 CAPSULE, DELAYED RELEASE ORAL DAILY
Qty: 60 CAPSULE | Refills: 3 | Status: SHIPPED | OUTPATIENT
Start: 2018-11-23 | End: 2018-11-29 | Stop reason: DRUGHIGH

## 2018-11-23 RX ORDER — BENAZEPRIL HYDROCHLORIDE 20 MG/1
TABLET ORAL
Qty: 90 TABLET | Refills: 3 | Status: SHIPPED | OUTPATIENT
Start: 2018-11-23 | End: 2019-10-23

## 2018-11-23 RX ORDER — BUPROPION HYDROCHLORIDE 150 MG/1
150 TABLET, EXTENDED RELEASE ORAL 2 TIMES DAILY
Qty: 180 TABLET | Refills: 3 | Status: SHIPPED | OUTPATIENT
Start: 2018-11-23 | End: 2019-10-23

## 2018-11-23 RX ORDER — CITALOPRAM 40 MG/1
40 TABLET, FILM COATED ORAL DAILY
Qty: 90 TABLET | Refills: 3 | Status: SHIPPED | OUTPATIENT
Start: 2018-11-23 | End: 2019-12-03 | Stop reason: SDUPTHER

## 2018-11-23 NOTE — PROGRESS NOTES
"Subjective:       Patient ID: Christi Coreas     Chief Complaint: Hypertension (follow up); Hip Pain (level 3 hip pain on left hip); and Sinus Problem (cough, nasal congestion, chest congestion)      Cornelius Coreas is a 67 y.o. female.here for annual follow up HTN, HL, and prediabetes.  Blood sugars improved overall.  Lipids ok.  Recent allergic rhinitis symptoms x 5 days.  No cardiac symptoms.  Mild cough this am.    Review of patient's allergies indicates:   Allergen Reactions    Aspirin     Nexium [esomeprazole magnesium] Other (See Comments)     "bad pain"       Current Outpatient Medications:     alendronate (FOSAMAX) 70 MG tablet, Take 1 tablet (70 mg total) by mouth once a week., Disp: 12 tablet, Rfl: 3    atorvastatin (LIPITOR) 40 MG tablet, TAKE ONE TABLET BY MOUTH ONCE DAILY IN THE EVENING, Disp: 90 tablet, Rfl: 3    benazepril (LOTENSIN) 10 MG tablet, TAKE ONE TABLET BY MOUTH ONCE DAILY .  NO  FURTHER  REFILLS  UNTIL  SEEN  BY  DOCTOR., Disp: 90 tablet, Rfl: 3    buPROPion (WELLBUTRIN SR) 150 MG TBSR 12 hr tablet, Take 1 tablet (150 mg total) by mouth 2 (two) times daily., Disp: 180 tablet, Rfl: 3    citalopram (CELEXA) 40 MG tablet, Take 1 tablet (40 mg total) by mouth once daily., Disp: 90 tablet, Rfl: 3    gabapentin (NEURONTIN) 300 MG capsule, Take 1 capsule (300 mg total) by mouth 3 (three) times daily., Disp: 270 capsule, Rfl: 3    hydrOXYzine HCl (ATARAX) 25 MG tablet, Take 1 tablet (25 mg total) by mouth 3 (three) times daily as needed for Itching., Disp: 30 tablet, Rfl: 3    levocetirizine (XYZAL) 5 MG tablet, Take 1 tablet (5 mg total) by mouth every evening., Disp: 30 tablet, Rfl: 0    omeprazole (PRILOSEC) 20 MG capsule, Take 2 capsules (40 mg total) by mouth once daily., Disp: 60 capsule, Rfl: 3    Past Medical History:   Diagnosis Date    Degenerative disc disease     lumbar    Glucose intolerance (impaired glucose tolerance)     Hypertension      Review of Systems "   Constitutional: Negative for appetite change, chills, diaphoresis, fatigue and fever.   HENT: Positive for congestion and rhinorrhea. Negative for sinus pressure and trouble swallowing.    Eyes: Negative for pain and visual disturbance.   Respiratory: Positive for cough. Negative for chest tightness, shortness of breath and wheezing.    Cardiovascular: Negative for chest pain, palpitations and leg swelling.   Gastrointestinal: Negative for abdominal pain, blood in stool, constipation, diarrhea, nausea and vomiting.   Endocrine: Negative for polydipsia and polyphagia.   Genitourinary: Negative for difficulty urinating, dysuria, hematuria, menstrual problem, pelvic pain and vaginal discharge.   Musculoskeletal: Positive for arthralgias (hip). Negative for back pain, joint swelling and neck pain.   Skin: Negative for color change and rash.   Allergic/Immunologic: Positive for environmental allergies. Negative for food allergies.   Neurological: Negative for dizziness, numbness and headaches.   Hematological: Negative for adenopathy. Does not bruise/bleed easily.   Psychiatric/Behavioral: Negative for dysphoric mood and sleep disturbance. The patient is not nervous/anxious.        Objective:      Physical Exam   Constitutional: She is oriented to person, place, and time. She appears well-developed and well-nourished.   HENT:   Head: Normocephalic and atraumatic.   Right Ear: External ear normal.   Left Ear: External ear normal.   Nose: Nose normal.   Mouth/Throat: Oropharynx is clear and moist.   Eyes: Conjunctivae are normal. Pupils are equal, round, and reactive to light. No scleral icterus.   Neck: Normal range of motion. Neck supple. No thyromegaly present.   Cardiovascular: Normal rate and regular rhythm. Exam reveals no gallop and no friction rub.   No murmur heard.  Pulmonary/Chest: Effort normal and breath sounds normal. She has no wheezes. She has no rales.   Abdominal: Soft. Bowel sounds are normal. She  exhibits no distension and no mass. There is no hepatosplenomegaly. There is no tenderness.   Genitourinary: Rectum normal. Rectal exam shows no mass. No breast tenderness. Cervix exhibits no motion tenderness and no discharge. Right adnexum displays no mass and no tenderness. Left adnexum displays no mass and no tenderness. No erythema in the vagina. No vaginal discharge found.   Lymphadenopathy:     She has no cervical adenopathy.     She has no axillary adenopathy.        Right: No supraclavicular adenopathy present.        Left: No supraclavicular adenopathy present.   Neurological: She is alert and oriented to person, place, and time.   Skin: Skin is warm and dry. No rash noted.   Psychiatric: She has a normal mood and affect. Her behavior is normal.       Assessment:       1. Colon cancer screening    2. Essential hypertension    3. Hyperlipidemia, unspecified hyperlipidemia type    4. Disorder of bone and articular cartilage    5. GERD (gastroesophageal reflux disease)    6. Pruritic rash    7. Depression with anxiety    8. Pain of both hip joints        Plan:       Christi was seen today for hypertension, hip pain and sinus problem.    Diagnoses and all orders for this visit:    Colon cancer screening  -     Case request GI: COLONOSCOPY    Essential hypertension  -     benazepril (LOTENSIN) 10 MG tablet; TAKE ONE TABLET BY MOUTH ONCE DAILY .  NO  FURTHER  REFILLS  UNTIL  SEEN  BY  DOCTOR.    Hyperlipidemia, unspecified hyperlipidemia type  -     atorvastatin (LIPITOR) 40 MG tablet; TAKE ONE TABLET BY MOUTH ONCE DAILY IN THE EVENING    Disorder of bone and articular cartilage  -     alendronate (FOSAMAX) 70 MG tablet; Take 1 tablet (70 mg total) by mouth once a week.    GERD (gastroesophageal reflux disease)  -     omeprazole (PRILOSEC) 20 MG capsule; Take 2 capsules (40 mg total) by mouth once daily.    Pruritic rash  -     hydrOXYzine HCl (ATARAX) 25 MG tablet; Take 1 tablet (25 mg total) by mouth 3 (three)  times daily as needed for Itching.    Depression with anxiety  -     citalopram (CELEXA) 40 MG tablet; Take 1 tablet (40 mg total) by mouth once daily.  -     buPROPion (WELLBUTRIN SR) 150 MG TBSR 12 hr tablet; Take 1 tablet (150 mg total) by mouth 2 (two) times daily.    Pain of both hip joints  -     gabapentin (NEURONTIN) 300 MG capsule; Take 1 capsule (300 mg total) by mouth 3 (three) times daily.

## 2018-11-23 NOTE — TELEPHONE ENCOUNTER
----- Message from Dahlia Corea MD sent at 11/23/2018  1:11 PM CST -----  Labs pertinent for elevated glucose.  Recommend hba1c

## 2018-11-29 DIAGNOSIS — K21.9 GASTROESOPHAGEAL REFLUX DISEASE, ESOPHAGITIS PRESENCE NOT SPECIFIED: Primary | ICD-10-CM

## 2018-11-29 RX ORDER — OMEPRAZOLE 40 MG/1
40 CAPSULE, DELAYED RELEASE ORAL DAILY
Qty: 30 CAPSULE | Refills: 11 | Status: SHIPPED | OUTPATIENT
Start: 2018-11-29 | End: 2020-09-24

## 2018-11-29 NOTE — TELEPHONE ENCOUNTER
Pharmacy will not cover (2) 20 MG capsules per day. Can we have new RX for (1) 40 MG capsule per day

## 2019-03-01 ENCOUNTER — TELEPHONE (OUTPATIENT)
Dept: FAMILY MEDICINE | Facility: CLINIC | Age: 69
End: 2019-03-01

## 2019-03-01 NOTE — TELEPHONE ENCOUNTER
Attempt to contact patient. No answer. Left message advising patient to contact [phone] to schedule colonoscopy.

## 2019-03-01 NOTE — TELEPHONE ENCOUNTER
----- Message from Lolita Lyn LPN sent at 3/1/2019  4:52 PM CST -----          03/01/2019  Medical Record # [id number]     Dear Dr. Corea,  An order for the following procedure,colonoscopy , was placed for Christi Coreas. Three attempts have been made to reach your patient at [phone] (home) . Please follow up with the patient regarding scheduling this procedure.        Sincerely,  Lolita Lyn LPN [phone]

## 2019-08-16 DIAGNOSIS — Z12.39 BREAST CANCER SCREENING: ICD-10-CM

## 2019-10-23 ENCOUNTER — OFFICE VISIT (OUTPATIENT)
Dept: FAMILY MEDICINE | Facility: CLINIC | Age: 69
End: 2019-10-23
Payer: MEDICARE

## 2019-10-23 VITALS
SYSTOLIC BLOOD PRESSURE: 148 MMHG | WEIGHT: 130.5 LBS | BODY MASS INDEX: 24.01 KG/M2 | OXYGEN SATURATION: 97 % | DIASTOLIC BLOOD PRESSURE: 88 MMHG | HEART RATE: 72 BPM | TEMPERATURE: 98 F | RESPIRATION RATE: 16 BRPM | HEIGHT: 62 IN

## 2019-10-23 DIAGNOSIS — Z23 NEED FOR INFLUENZA VACCINATION: ICD-10-CM

## 2019-10-23 DIAGNOSIS — Z12.11 COLON CANCER SCREENING: ICD-10-CM

## 2019-10-23 DIAGNOSIS — Z00.00 WELL ADULT EXAM: Primary | ICD-10-CM

## 2019-10-23 DIAGNOSIS — I10 UNCONTROLLED HYPERTENSION: ICD-10-CM

## 2019-10-23 PROCEDURE — 3077F SYST BP >= 140 MM HG: CPT | Mod: S$GLB,,, | Performed by: FAMILY MEDICINE

## 2019-10-23 PROCEDURE — 99999 PR PBB SHADOW E&M-EST. PATIENT-LVL III: ICD-10-PCS | Mod: PBBFAC,,, | Performed by: FAMILY MEDICINE

## 2019-10-23 PROCEDURE — 99397 PR PREVENTIVE VISIT,EST,65 & OVER: ICD-10-PCS | Mod: 25,S$GLB,, | Performed by: FAMILY MEDICINE

## 2019-10-23 PROCEDURE — 3077F PR MOST RECENT SYSTOLIC BLOOD PRESSURE >= 140 MM HG: ICD-10-PCS | Mod: S$GLB,,, | Performed by: FAMILY MEDICINE

## 2019-10-23 PROCEDURE — 3079F DIAST BP 80-89 MM HG: CPT | Mod: S$GLB,,, | Performed by: FAMILY MEDICINE

## 2019-10-23 PROCEDURE — 99999 PR PBB SHADOW E&M-EST. PATIENT-LVL III: CPT | Mod: PBBFAC,,, | Performed by: FAMILY MEDICINE

## 2019-10-23 PROCEDURE — 90662 IIV NO PRSV INCREASED AG IM: CPT | Mod: S$GLB,,, | Performed by: FAMILY MEDICINE

## 2019-10-23 PROCEDURE — 90662 FLU VACCINE - HIGH DOSE (65+) PRESERVATIVE FREE IM: ICD-10-PCS | Mod: S$GLB,,, | Performed by: FAMILY MEDICINE

## 2019-10-23 PROCEDURE — G0008 FLU VACCINE - HIGH DOSE (65+) PRESERVATIVE FREE IM: ICD-10-PCS | Mod: S$GLB,,, | Performed by: FAMILY MEDICINE

## 2019-10-23 PROCEDURE — 99397 PER PM REEVAL EST PAT 65+ YR: CPT | Mod: 25,S$GLB,, | Performed by: FAMILY MEDICINE

## 2019-10-23 PROCEDURE — 3079F PR MOST RECENT DIASTOLIC BLOOD PRESSURE 80-89 MM HG: ICD-10-PCS | Mod: S$GLB,,, | Performed by: FAMILY MEDICINE

## 2019-10-23 PROCEDURE — G0008 ADMIN INFLUENZA VIRUS VAC: HCPCS | Mod: S$GLB,,, | Performed by: FAMILY MEDICINE

## 2019-10-23 RX ORDER — BENAZEPRIL HYDROCHLORIDE 40 MG/1
40 TABLET ORAL DAILY
Qty: 90 TABLET | Refills: 1 | Status: SHIPPED | OUTPATIENT
Start: 2019-10-23 | End: 2020-06-02 | Stop reason: SDUPTHER

## 2019-10-23 NOTE — PROGRESS NOTES
Pt tolerated flu vaccine to left deltoid without difficulty; no adverse reaction noted; VIS given  [employer] was given to patient on 10/23/2019 3:17 PM

## 2019-10-28 NOTE — PROGRESS NOTES
"Subjective:       Patient ID: Christi Coreas     Chief Complaint: Annual Exam      HPIChristi Coreas is a 68 y.o. female here for annul exam.  No acute complaints.    Review of patient's allergies indicates:   Allergen Reactions    Aspirin     Nexium [esomeprazole magnesium] Other (See Comments)     "bad pain"       Current Outpatient Medications:     alendronate (FOSAMAX) 70 MG tablet, Take 1 tablet (70 mg total) by mouth once a week., Disp: 12 tablet, Rfl: 3    atorvastatin (LIPITOR) 40 MG tablet, TAKE ONE TABLET BY MOUTH ONCE DAILY IN THE EVENING, Disp: 90 tablet, Rfl: 3    benazepril (LOTENSIN) 40 MG tablet, Take 1 tablet (40 mg total) by mouth once daily. TAKE ONE TABLET BY MOUTH ONCE DAILY .  NO  FURTHER  REFILLS  UNTIL  SEEN  BY  DOCTOR., Disp: 90 tablet, Rfl: 1    citalopram (CELEXA) 40 MG tablet, Take 1 tablet (40 mg total) by mouth once daily., Disp: 90 tablet, Rfl: 3    gabapentin (NEURONTIN) 300 MG capsule, Take 1 capsule (300 mg total) by mouth 3 (three) times daily., Disp: 270 capsule, Rfl: 3    hydrOXYzine HCl (ATARAX) 25 MG tablet, Take 1 tablet (25 mg total) by mouth 3 (three) times daily as needed for Itching., Disp: 30 tablet, Rfl: 3    omeprazole (PRILOSEC) 40 MG capsule, Take 1 capsule (40 mg total) by mouth once daily., Disp: 30 capsule, Rfl: 11    polyethylene glycol (COLYTE) 240-22.72-6.72 -5.84 gram SolR, Take 4,000 mLs (4 L total) by mouth as needed., Disp: 1 Bottle, Rfl: 0    levocetirizine (XYZAL) 5 MG tablet, Take 1 tablet (5 mg total) by mouth every evening., Disp: 30 tablet, Rfl: 0    Past Medical History:   Diagnosis Date    Degenerative disc disease     lumbar    Glucose intolerance (impaired glucose tolerance)     Hypertension      Review of Systems   Constitutional: Negative for appetite change, chills, diaphoresis, fatigue and fever.   HENT: Negative for sinus pressure and trouble swallowing.    Eyes: Negative for pain and visual disturbance.   Respiratory: Negative " for cough, chest tightness, shortness of breath and wheezing.    Cardiovascular: Negative for chest pain, palpitations and leg swelling.   Gastrointestinal: Negative for abdominal pain, blood in stool, constipation, diarrhea, nausea and vomiting.   Endocrine: Negative for polydipsia and polyphagia.   Genitourinary: Negative for difficulty urinating, dysuria, hematuria, menstrual problem, pelvic pain and vaginal discharge.   Musculoskeletal: Negative for back pain, joint swelling and neck pain.   Skin: Negative for color change and rash.   Allergic/Immunologic: Negative for environmental allergies and food allergies.   Neurological: Negative for dizziness, numbness and headaches.   Hematological: Negative for adenopathy. Does not bruise/bleed easily.   Psychiatric/Behavioral: Negative for dysphoric mood and sleep disturbance. The patient is not nervous/anxious.        Objective:      Physical Exam   Constitutional: She is oriented to person, place, and time. She appears well-developed and well-nourished.   HENT:   Head: Normocephalic and atraumatic.   Right Ear: External ear normal.   Left Ear: External ear normal.   Nose: Nose normal.   Mouth/Throat: Oropharynx is clear and moist.   Eyes: Pupils are equal, round, and reactive to light. Conjunctivae are normal. No scleral icterus.   Neck: Normal range of motion. Neck supple. No thyromegaly present.   Cardiovascular: Normal rate and regular rhythm. Exam reveals no gallop and no friction rub.   No murmur heard.  Pulmonary/Chest: Effort normal and breath sounds normal. She has no wheezes. She has no rales. No breast tenderness.   Abdominal: Soft. Bowel sounds are normal. She exhibits no distension and no mass. There is no hepatosplenomegaly. There is no tenderness.   Genitourinary: No breast tenderness.   Musculoskeletal: She exhibits no edema or deformity.   Lymphadenopathy:     She has no cervical adenopathy.     She has no axillary adenopathy.        Right: No  supraclavicular adenopathy present.        Left: No supraclavicular adenopathy present.   Neurological: She is alert and oriented to person, place, and time.   Skin: Skin is warm and dry. No rash noted.   Psychiatric: She has a normal mood and affect. Her behavior is normal.       Assessment:       1. Well adult exam    2. Need for influenza vaccination    3. Uncontrolled hypertension    4. Colon cancer screening        Plan:       Christi was seen today for annual exam.    Diagnoses and all orders for this visit:    Well exam  Health maintenance UTD    Need for influenza vaccination  -     Influenza - High Dose (65+) (PF) (IM)    Uncontrolled hypertension  -     benazepril (LOTENSIN) 40 MG tablet; Take 1 tablet (40 mg total) by mouth once daily. -     Lipid panel; Future  -     Comprehensive metabolic panel; Future  -     Hemoglobin A1c; Future  Nurse BP check in 2 weeks.    Colon cancer screening  -     Fecal Immunochemical Test (iFOBT); Future

## 2019-11-07 ENCOUNTER — HOSPITAL ENCOUNTER (OUTPATIENT)
Dept: RADIOLOGY | Facility: HOSPITAL | Age: 69
Discharge: HOME | End: 2019-11-07
Attending: FAMILY MEDICINE
Payer: MEDICARE

## 2019-11-07 ENCOUNTER — CLINICAL SUPPORT (OUTPATIENT)
Dept: FAMILY MEDICINE | Facility: CLINIC | Age: 69
End: 2019-11-07
Payer: MEDICARE

## 2019-11-07 VITALS — SYSTOLIC BLOOD PRESSURE: 120 MMHG | DIASTOLIC BLOOD PRESSURE: 64 MMHG

## 2019-11-07 DIAGNOSIS — Z12.39 BREAST CANCER SCREENING: ICD-10-CM

## 2019-11-07 DIAGNOSIS — I10 ESSENTIAL HYPERTENSION: Primary | ICD-10-CM

## 2019-11-07 PROCEDURE — 77067 SCR MAMMO BI INCL CAD: CPT | Mod: 26,,, | Performed by: RADIOLOGY

## 2019-11-07 PROCEDURE — 77063 MAMMO DIGITAL SCREENING BILAT WITH TOMOSYNTHESIS_CAD: ICD-10-PCS | Mod: 26,,, | Performed by: RADIOLOGY

## 2019-11-07 PROCEDURE — 77063 BREAST TOMOSYNTHESIS BI: CPT | Mod: 26,,, | Performed by: RADIOLOGY

## 2019-11-07 PROCEDURE — 77063 BREAST TOMOSYNTHESIS BI: CPT | Mod: TC,PO

## 2019-11-07 PROCEDURE — 77067 MAMMO DIGITAL SCREENING BILAT WITH TOMOSYNTHESIS_CAD: ICD-10-PCS | Mod: 26,,, | Performed by: RADIOLOGY

## 2019-11-07 NOTE — PROGRESS NOTES
"Christi Coreas 68 y.o. female is here today for Blood Pressure check.   History of HTN yes.    Review of patient's allergies indicates:   Allergen Reactions    Aspirin     Nexium [esomeprazole magnesium] Other (See Comments)     "bad pain"     Creatinine   Date Value Ref Range Status   11/15/2018 0.8 0.5 - 1.4 mg/dL Final     Sodium   Date Value Ref Range Status   11/15/2018 142 136 - 145 mmol/L Final     Potassium   Date Value Ref Range Status   11/15/2018 3.8 3.5 - 5.1 mmol/L Final   ]  Patient verifies taking blood pressure medications on a regular basis at the same time of the day.     Current Outpatient Medications:     alendronate (FOSAMAX) 70 MG tablet, Take 1 tablet (70 mg total) by mouth once a week., Disp: 12 tablet, Rfl: 3    atorvastatin (LIPITOR) 40 MG tablet, TAKE ONE TABLET BY MOUTH ONCE DAILY IN THE EVENING, Disp: 90 tablet, Rfl: 3    benazepril (LOTENSIN) 40 MG tablet, Take 1 tablet (40 mg total) by mouth once daily. TAKE ONE TABLET BY MOUTH ONCE DAILY .  NO  FURTHER  REFILLS  UNTIL  SEEN  BY  DOCTOR., Disp: 90 tablet, Rfl: 1    citalopram (CELEXA) 40 MG tablet, Take 1 tablet (40 mg total) by mouth once daily., Disp: 90 tablet, Rfl: 3    gabapentin (NEURONTIN) 300 MG capsule, Take 1 capsule (300 mg total) by mouth 3 (three) times daily., Disp: 270 capsule, Rfl: 3    hydrOXYzine HCl (ATARAX) 25 MG tablet, Take 1 tablet (25 mg total) by mouth 3 (three) times daily as needed for Itching., Disp: 30 tablet, Rfl: 3    levocetirizine (XYZAL) 5 MG tablet, Take 1 tablet (5 mg total) by mouth every evening., Disp: 30 tablet, Rfl: 0    omeprazole (PRILOSEC) 40 MG capsule, Take 1 capsule (40 mg total) by mouth once daily., Disp: 30 capsule, Rfl: 11    polyethylene glycol (COLYTE) 240-22.72-6.72 -5.84 gram SolR, Take 4,000 mLs (4 L total) by mouth as needed., Disp: 1 Bottle, Rfl: 0  Does patient have record of home blood pressure readings no. Readings have been averaging not done.   Last dose of blood " pressure medication was taken at this morning.  Patient is asymptomatic.   Complains of no complaints.    Vitals:    11/07/19 1050   BP: 120/64   BP Location: Right arm   Patient Position: Sitting   BP Method: Medium (Manual)         Dr. Corea informed of nurse visit.

## 2019-11-11 ENCOUNTER — TELEPHONE (OUTPATIENT)
Dept: FAMILY MEDICINE | Facility: CLINIC | Age: 69
End: 2019-11-11

## 2019-11-11 NOTE — TELEPHONE ENCOUNTER
----- Message from Dahlia Corea MD sent at 11/8/2019  5:17 PM CST -----  Mammogram did not reveal any evidence of malignancy.  Sincerely,  Dr. Corea

## 2019-11-14 ENCOUNTER — PATIENT OUTREACH (OUTPATIENT)
Dept: ADMINISTRATIVE | Facility: HOSPITAL | Age: 69
End: 2019-11-14

## 2019-11-14 ENCOUNTER — TELEPHONE (OUTPATIENT)
Dept: ADMINISTRATIVE | Facility: HOSPITAL | Age: 69
End: 2019-11-14

## 2019-11-22 ENCOUNTER — TELEPHONE (OUTPATIENT)
Dept: FAMILY MEDICINE | Facility: CLINIC | Age: 69
End: 2019-11-22

## 2019-12-02 ENCOUNTER — PES CALL (OUTPATIENT)
Dept: ADMINISTRATIVE | Facility: CLINIC | Age: 69
End: 2019-12-02

## 2019-12-03 DIAGNOSIS — E78.5 HYPERLIPIDEMIA, UNSPECIFIED HYPERLIPIDEMIA TYPE: ICD-10-CM

## 2019-12-03 DIAGNOSIS — F41.8 DEPRESSION WITH ANXIETY: ICD-10-CM

## 2019-12-03 RX ORDER — ATORVASTATIN CALCIUM 40 MG/1
TABLET, FILM COATED ORAL
Qty: 90 TABLET | Refills: 3 | Status: SHIPPED | OUTPATIENT
Start: 2019-12-03 | End: 2020-10-15 | Stop reason: SDUPTHER

## 2019-12-03 RX ORDER — CITALOPRAM 40 MG/1
TABLET, FILM COATED ORAL
Qty: 90 TABLET | Refills: 3 | Status: SHIPPED | OUTPATIENT
Start: 2019-12-03 | End: 2020-10-15 | Stop reason: SDUPTHER

## 2020-01-05 DIAGNOSIS — I10 ESSENTIAL HYPERTENSION: ICD-10-CM

## 2020-01-06 RX ORDER — BENAZEPRIL HYDROCHLORIDE 20 MG/1
20 TABLET ORAL DAILY
Qty: 90 TABLET | Refills: 0 | OUTPATIENT
Start: 2020-01-06

## 2020-03-04 DIAGNOSIS — M25.551 PAIN OF BOTH HIP JOINTS: ICD-10-CM

## 2020-03-04 DIAGNOSIS — M89.9 DISORDER OF BONE AND ARTICULAR CARTILAGE: ICD-10-CM

## 2020-03-04 DIAGNOSIS — M94.9 DISORDER OF BONE AND ARTICULAR CARTILAGE: ICD-10-CM

## 2020-03-04 DIAGNOSIS — M25.552 PAIN OF BOTH HIP JOINTS: ICD-10-CM

## 2020-03-04 RX ORDER — ALENDRONATE SODIUM 70 MG/1
TABLET ORAL
Qty: 4 TABLET | Refills: 6 | Status: SHIPPED | OUTPATIENT
Start: 2020-03-04 | End: 2020-10-15 | Stop reason: SDUPTHER

## 2020-03-04 RX ORDER — GABAPENTIN 300 MG/1
CAPSULE ORAL
Qty: 90 CAPSULE | Refills: 6 | Status: SHIPPED | OUTPATIENT
Start: 2020-03-04 | End: 2020-10-15 | Stop reason: SDUPTHER

## 2020-05-30 DIAGNOSIS — I10 UNCONTROLLED HYPERTENSION: ICD-10-CM

## 2020-06-02 RX ORDER — BENAZEPRIL HYDROCHLORIDE 40 MG/1
TABLET ORAL
Qty: 90 TABLET | Refills: 0 | Status: SHIPPED | OUTPATIENT
Start: 2020-06-02 | End: 2020-09-24

## 2020-09-24 DIAGNOSIS — K21.9 GASTROESOPHAGEAL REFLUX DISEASE, ESOPHAGITIS PRESENCE NOT SPECIFIED: ICD-10-CM

## 2020-09-24 DIAGNOSIS — I10 UNCONTROLLED HYPERTENSION: ICD-10-CM

## 2020-09-24 DIAGNOSIS — R73.02 GLUCOSE INTOLERANCE (IMPAIRED GLUCOSE TOLERANCE): Primary | ICD-10-CM

## 2020-09-24 RX ORDER — OMEPRAZOLE 40 MG/1
CAPSULE, DELAYED RELEASE ORAL
Qty: 30 CAPSULE | Refills: 0 | Status: SHIPPED | OUTPATIENT
Start: 2020-09-24 | End: 2020-10-15 | Stop reason: SDUPTHER

## 2020-09-24 RX ORDER — BENAZEPRIL HYDROCHLORIDE 40 MG/1
TABLET ORAL
Qty: 30 TABLET | Refills: 0 | Status: SHIPPED | OUTPATIENT
Start: 2020-09-24 | End: 2020-10-15 | Stop reason: SDUPTHER

## 2020-09-25 NOTE — TELEPHONE ENCOUNTER
Spoke with patient notify medication was approved also appoint schedule at these time labs and f/up . Patient verbalized understand .     10/12/20 @ 10 :00 am   10/15/20 @ 10:40 am .

## 2020-10-02 ENCOUNTER — PATIENT OUTREACH (OUTPATIENT)
Dept: ADMINISTRATIVE | Facility: HOSPITAL | Age: 70
End: 2020-10-02

## 2020-10-12 ENCOUNTER — LAB VISIT (OUTPATIENT)
Dept: LAB | Facility: HOSPITAL | Age: 70
End: 2020-10-12
Attending: PHYSICIAN ASSISTANT
Payer: MEDICARE

## 2020-10-12 DIAGNOSIS — R73.02 GLUCOSE INTOLERANCE (IMPAIRED GLUCOSE TOLERANCE): ICD-10-CM

## 2020-10-12 DIAGNOSIS — I10 UNCONTROLLED HYPERTENSION: ICD-10-CM

## 2020-10-12 LAB
ALBUMIN SERPL BCP-MCNC: 4.1 G/DL (ref 3.5–5.2)
ALP SERPL-CCNC: 71 U/L (ref 55–135)
ALT SERPL W/O P-5'-P-CCNC: 15 U/L (ref 10–44)
ANION GAP SERPL CALC-SCNC: 7 MMOL/L (ref 8–16)
AST SERPL-CCNC: 25 U/L (ref 10–40)
BASOPHILS # BLD AUTO: 0.05 K/UL (ref 0–0.2)
BASOPHILS NFR BLD: 1 % (ref 0–1.9)
BILIRUB SERPL-MCNC: 0.4 MG/DL (ref 0.1–1)
BUN SERPL-MCNC: 18 MG/DL (ref 8–23)
CALCIUM SERPL-MCNC: 9.5 MG/DL (ref 8.7–10.5)
CHLORIDE SERPL-SCNC: 103 MMOL/L (ref 95–110)
CHOLEST SERPL-MCNC: 175 MG/DL (ref 120–199)
CHOLEST/HDLC SERPL: 2.9 {RATIO} (ref 2–5)
CO2 SERPL-SCNC: 30 MMOL/L (ref 23–29)
CREAT SERPL-MCNC: 0.8 MG/DL (ref 0.5–1.4)
DIFFERENTIAL METHOD: ABNORMAL
EOSINOPHIL # BLD AUTO: 0.1 K/UL (ref 0–0.5)
EOSINOPHIL NFR BLD: 2.1 % (ref 0–8)
ERYTHROCYTE [DISTWIDTH] IN BLOOD BY AUTOMATED COUNT: 12.6 % (ref 11.5–14.5)
EST. GFR  (AFRICAN AMERICAN): >60 ML/MIN/1.73 M^2
EST. GFR  (NON AFRICAN AMERICAN): >60 ML/MIN/1.73 M^2
ESTIMATED AVG GLUCOSE: 126 MG/DL (ref 68–131)
GLUCOSE SERPL-MCNC: 108 MG/DL (ref 70–110)
HBA1C MFR BLD HPLC: 6 % (ref 4–5.6)
HCT VFR BLD AUTO: 39.1 % (ref 37–48.5)
HDLC SERPL-MCNC: 61 MG/DL (ref 40–75)
HDLC SERPL: 34.9 % (ref 20–50)
HGB BLD-MCNC: 12.4 G/DL (ref 12–16)
IMM GRANULOCYTES # BLD AUTO: 0.02 K/UL (ref 0–0.04)
IMM GRANULOCYTES NFR BLD AUTO: 0.4 % (ref 0–0.5)
LDLC SERPL CALC-MCNC: 101.2 MG/DL (ref 63–159)
LYMPHOCYTES # BLD AUTO: 1.1 K/UL (ref 1–4.8)
LYMPHOCYTES NFR BLD: 23.8 % (ref 18–48)
MCH RBC QN AUTO: 29.6 PG (ref 27–31)
MCHC RBC AUTO-ENTMCNC: 31.7 G/DL (ref 32–36)
MCV RBC AUTO: 93 FL (ref 82–98)
MONOCYTES # BLD AUTO: 0.4 K/UL (ref 0.3–1)
MONOCYTES NFR BLD: 8.8 % (ref 4–15)
NEUTROPHILS # BLD AUTO: 3.1 K/UL (ref 1.8–7.7)
NEUTROPHILS NFR BLD: 63.9 % (ref 38–73)
NONHDLC SERPL-MCNC: 114 MG/DL
NRBC BLD-RTO: 0 /100 WBC
PLATELET # BLD AUTO: 237 K/UL (ref 150–350)
PMV BLD AUTO: 10.9 FL (ref 9.2–12.9)
POTASSIUM SERPL-SCNC: 4.2 MMOL/L (ref 3.5–5.1)
PROT SERPL-MCNC: 7.2 G/DL (ref 6–8.4)
RBC # BLD AUTO: 4.19 M/UL (ref 4–5.4)
SODIUM SERPL-SCNC: 140 MMOL/L (ref 136–145)
TRIGL SERPL-MCNC: 64 MG/DL (ref 30–150)
WBC # BLD AUTO: 4.79 K/UL (ref 3.9–12.7)

## 2020-10-12 PROCEDURE — 80053 COMPREHEN METABOLIC PANEL: CPT

## 2020-10-12 PROCEDURE — 83036 HEMOGLOBIN GLYCOSYLATED A1C: CPT

## 2020-10-12 PROCEDURE — 80061 LIPID PANEL: CPT

## 2020-10-12 PROCEDURE — 85025 COMPLETE CBC W/AUTO DIFF WBC: CPT

## 2020-10-12 PROCEDURE — 36415 COLL VENOUS BLD VENIPUNCTURE: CPT | Mod: PO

## 2020-10-15 ENCOUNTER — LAB VISIT (OUTPATIENT)
Dept: LAB | Facility: HOSPITAL | Age: 70
End: 2020-10-15
Attending: FAMILY MEDICINE
Payer: MEDICARE

## 2020-10-15 ENCOUNTER — OFFICE VISIT (OUTPATIENT)
Dept: FAMILY MEDICINE | Facility: CLINIC | Age: 70
End: 2020-10-15
Payer: MEDICARE

## 2020-10-15 VITALS
SYSTOLIC BLOOD PRESSURE: 104 MMHG | BODY MASS INDEX: 24.38 KG/M2 | OXYGEN SATURATION: 97 % | WEIGHT: 132.5 LBS | HEART RATE: 82 BPM | TEMPERATURE: 98 F | HEIGHT: 62 IN | DIASTOLIC BLOOD PRESSURE: 68 MMHG | RESPIRATION RATE: 16 BRPM

## 2020-10-15 DIAGNOSIS — M25.551 PAIN OF BOTH HIP JOINTS: ICD-10-CM

## 2020-10-15 DIAGNOSIS — K21.9 GASTROESOPHAGEAL REFLUX DISEASE WITHOUT ESOPHAGITIS: ICD-10-CM

## 2020-10-15 DIAGNOSIS — M25.552 PAIN OF BOTH HIP JOINTS: ICD-10-CM

## 2020-10-15 DIAGNOSIS — R73.09 ELEVATED GLUCOSE: ICD-10-CM

## 2020-10-15 DIAGNOSIS — M89.9 DISORDER OF BONE AND ARTICULAR CARTILAGE: ICD-10-CM

## 2020-10-15 DIAGNOSIS — M94.9 DISORDER OF BONE AND ARTICULAR CARTILAGE: ICD-10-CM

## 2020-10-15 DIAGNOSIS — F41.8 DEPRESSION WITH ANXIETY: ICD-10-CM

## 2020-10-15 DIAGNOSIS — I10 ESSENTIAL HYPERTENSION: ICD-10-CM

## 2020-10-15 DIAGNOSIS — R10.9 BILATERAL FLANK PAIN: ICD-10-CM

## 2020-10-15 DIAGNOSIS — E78.5 HYPERLIPIDEMIA, UNSPECIFIED HYPERLIPIDEMIA TYPE: ICD-10-CM

## 2020-10-15 DIAGNOSIS — M81.0 OSTEOPOROSIS, UNSPECIFIED OSTEOPOROSIS TYPE, UNSPECIFIED PATHOLOGICAL FRACTURE PRESENCE: ICD-10-CM

## 2020-10-15 DIAGNOSIS — Z00.00 WELL ADULT EXAM: Primary | ICD-10-CM

## 2020-10-15 DIAGNOSIS — J30.2 SEASONAL ALLERGIC RHINITIS, UNSPECIFIED TRIGGER: ICD-10-CM

## 2020-10-15 LAB
BILIRUB UR QL STRIP: NEGATIVE
CLARITY UR REFRACT.AUTO: CLEAR
COLOR UR AUTO: YELLOW
GLUCOSE UR QL STRIP: NEGATIVE
HGB UR QL STRIP: NEGATIVE
KETONES UR QL STRIP: NEGATIVE
LEUKOCYTE ESTERASE UR QL STRIP: NEGATIVE
NITRITE UR QL STRIP: NEGATIVE
PH UR STRIP: 6 [PH] (ref 5–8)
PROT UR QL STRIP: NEGATIVE
SP GR UR STRIP: 1.02 (ref 1–1.03)
URN SPEC COLLECT METH UR: NORMAL

## 2020-10-15 PROCEDURE — 99397 PR PREVENTIVE VISIT,EST,65 & OVER: ICD-10-PCS | Mod: S$GLB,,, | Performed by: FAMILY MEDICINE

## 2020-10-15 PROCEDURE — 99999 PR PBB SHADOW E&M-EST. PATIENT-LVL IV: CPT | Mod: PBBFAC,,, | Performed by: FAMILY MEDICINE

## 2020-10-15 PROCEDURE — 99999 PR PBB SHADOW E&M-EST. PATIENT-LVL IV: ICD-10-PCS | Mod: PBBFAC,,, | Performed by: FAMILY MEDICINE

## 2020-10-15 PROCEDURE — 3074F SYST BP LT 130 MM HG: CPT | Mod: S$GLB,,, | Performed by: FAMILY MEDICINE

## 2020-10-15 PROCEDURE — 3074F PR MOST RECENT SYSTOLIC BLOOD PRESSURE < 130 MM HG: ICD-10-PCS | Mod: S$GLB,,, | Performed by: FAMILY MEDICINE

## 2020-10-15 PROCEDURE — 3078F PR MOST RECENT DIASTOLIC BLOOD PRESSURE < 80 MM HG: ICD-10-PCS | Mod: S$GLB,,, | Performed by: FAMILY MEDICINE

## 2020-10-15 PROCEDURE — 99397 PER PM REEVAL EST PAT 65+ YR: CPT | Mod: S$GLB,,, | Performed by: FAMILY MEDICINE

## 2020-10-15 PROCEDURE — 81003 URINALYSIS AUTO W/O SCOPE: CPT

## 2020-10-15 PROCEDURE — 3078F DIAST BP <80 MM HG: CPT | Mod: S$GLB,,, | Performed by: FAMILY MEDICINE

## 2020-10-15 RX ORDER — ATORVASTATIN CALCIUM 40 MG/1
TABLET, FILM COATED ORAL
Qty: 90 TABLET | Refills: 3 | Status: SHIPPED | OUTPATIENT
Start: 2020-10-15 | End: 2021-11-11

## 2020-10-15 RX ORDER — ALENDRONATE SODIUM 70 MG/1
70 TABLET ORAL
Qty: 12 TABLET | Refills: 3 | Status: SHIPPED | OUTPATIENT
Start: 2020-10-15 | End: 2022-10-25 | Stop reason: SDUPTHER

## 2020-10-15 RX ORDER — CITALOPRAM 40 MG/1
TABLET, FILM COATED ORAL
Qty: 90 TABLET | Refills: 3 | Status: SHIPPED | OUTPATIENT
Start: 2020-10-15 | End: 2021-11-11

## 2020-10-15 RX ORDER — BENAZEPRIL HYDROCHLORIDE 40 MG/1
40 TABLET ORAL DAILY
Qty: 90 TABLET | Refills: 3 | Status: SHIPPED | OUTPATIENT
Start: 2020-10-15 | End: 2021-11-11

## 2020-10-15 RX ORDER — GABAPENTIN 300 MG/1
300 CAPSULE ORAL NIGHTLY
Qty: 90 CAPSULE | Refills: 3 | Status: SHIPPED | OUTPATIENT
Start: 2020-10-15 | End: 2021-11-11

## 2020-10-15 RX ORDER — OMEPRAZOLE 40 MG/1
40 CAPSULE, DELAYED RELEASE ORAL DAILY
Qty: 90 CAPSULE | Refills: 3 | Status: SHIPPED | OUTPATIENT
Start: 2020-10-15

## 2020-10-15 RX ORDER — LEVOCETIRIZINE DIHYDROCHLORIDE 5 MG/1
5 TABLET, FILM COATED ORAL NIGHTLY
Qty: 90 TABLET | Refills: 3 | Status: SHIPPED | OUTPATIENT
Start: 2020-10-15 | End: 2022-02-08

## 2020-10-15 NOTE — PROGRESS NOTES
Subjective:       Patient ID: Christi Coreas is a 69 y.o. female.    Chief Complaint: Annual Exam and Back Pain    HPI:  Here for annual exam.  Reports bilateral flank pain radiating to left lateral trunk x several weeks.  Fell on right side 2 months ago.  Review of Systems   Constitutional: Negative for appetite change, chills, diaphoresis, fatigue and fever.   HENT: Negative for sinus pressure/congestion and trouble swallowing.    Eyes: Negative for pain and visual disturbance.   Respiratory: Negative for cough, chest tightness, shortness of breath and wheezing.    Cardiovascular: Negative for chest pain, palpitations and leg swelling.   Gastrointestinal: Negative for abdominal pain, blood in stool, constipation, diarrhea, nausea and vomiting.   Endocrine: Negative for polydipsia and polyphagia.   Genitourinary: Negative for difficulty urinating, dysuria, hematuria, menstrual problem, pelvic pain and vaginal discharge.   Musculoskeletal: Negative for back pain, joint swelling and neck pain.   Integumentary:  Negative for color change and rash.   Allergic/Immunologic: Negative for environmental allergies and food allergies.   Neurological: Negative for dizziness, numbness and headaches.   Hematological: Negative for adenopathy. Does not bruise/bleed easily.   Psychiatric/Behavioral: Negative for dysphoric mood and sleep disturbance. The patient is not nervous/anxious.          Objective:      Physical Exam  Constitutional:       Appearance: She is well-developed.   HENT:      Head: Normocephalic and atraumatic.      Right Ear: External ear normal.      Left Ear: External ear normal.      Nose: Nose normal.   Eyes:      General: No scleral icterus.     Conjunctiva/sclera: Conjunctivae normal.      Pupils: Pupils are equal, round, and reactive to light.   Neck:      Musculoskeletal: Normal range of motion and neck supple.      Thyroid: No thyromegaly.   Cardiovascular:      Rate and Rhythm: Normal rate and regular  rhythm.      Heart sounds: No murmur. No friction rub. No gallop.    Pulmonary:      Effort: Pulmonary effort is normal.      Breath sounds: Normal breath sounds. No wheezing or rales.   Abdominal:      General: Bowel sounds are normal. There is no distension.      Palpations: Abdomen is soft. There is no mass.      Tenderness: There is no abdominal tenderness.   Musculoskeletal:         General: No tenderness (thoracic spine) or deformity.   Lymphadenopathy:      Cervical: No cervical adenopathy.      Upper Body:      Right upper body: No supraclavicular adenopathy.      Left upper body: No supraclavicular adenopathy.   Skin:     General: Skin is warm and dry.      Findings: No rash.   Neurological:      Mental Status: She is alert and oriented to person, place, and time.   Psychiatric:         Behavior: Behavior normal.         Assessment:       1. Well adult exam    2. Bilateral flank pain    3. Essential hypertension    4. Osteoporosis, unspecified osteoporosis type, unspecified pathological fracture presence    5. Hyperlipidemia, unspecified hyperlipidemia type    6. Disorder of bone and articular cartilage    7. Depression with anxiety    8. Pain of both hip joints    9. Gastroesophageal reflux disease without esophagitis    10. Elevated glucose    11. Seasonal allergic rhinitis, unspecified trigger        Plan:     Christi was seen today for annual exam and back pain.    Diagnoses and all orders for this visit:    Well adult exam  Recommend Vitamin D 2,000 and calcium 1200 mg daily    Bilateral flank pain  -     Urinalysis  -     X-Ray Thoracic Spine AP Lateral; Future    Essential hypertension  -     benazepriL (LOTENSIN) 40 MG tablet; Take 1 tablet (40 mg total) by mouth once daily.    Osteoporosis, unspecified osteoporosis type, unspecified pathological fracture presence  -     DXA Bone Density Spine And Hip; Future    Hyperlipidemia, unspecified hyperlipidemia type  -     atorvastatin (LIPITOR) 40 MG  tablet; TAKE 1 TABLET BY MOUTH ONCE DAILY IN THE EVENING    Disorder of bone and articular cartilage  -     alendronate (FOSAMAX) 70 MG tablet; Take 1 tablet (70 mg total) by mouth every 7 days.    Depression with anxiety  -     citalopram (CELEXA) 40 MG tablet; TAKE 1 TABLET BY MOUTH ONCE DAILY    Pain of both hip joints  -     gabapentin (NEURONTIN) 300 MG capsule; Take 1 capsule (300 mg total) by mouth every evening.    Gastroesophageal reflux disease without esophagitis  -     omeprazole (PRILOSEC) 40 MG capsule; Take 1 capsule (40 mg total) by mouth once daily.    Seasonal allergic rhinitis, unspecified trigger  -     levocetirizine (XYZAL) 5 MG tablet; Take 1 tablet (5 mg total) by mouth every evening.

## 2020-10-20 ENCOUNTER — TELEPHONE (OUTPATIENT)
Dept: FAMILY MEDICINE | Facility: CLINIC | Age: 70
End: 2020-10-20

## 2020-10-20 NOTE — TELEPHONE ENCOUNTER
----- Message from Dahlia Corea MD sent at 10/19/2020  5:24 PM CDT -----  Urinalysis normal.  X-ray of thoracic spine revealed mild arthritis changes.  Please verify if patient continues to have flank pain.  Also would like to discuss changing her medical regimen because of interaction between Citalopram and omeprazole.  Would like to change one of these medications.

## 2020-10-27 ENCOUNTER — TELEPHONE (OUTPATIENT)
Dept: RADIOLOGY | Facility: HOSPITAL | Age: 70
End: 2020-10-27

## 2020-11-03 ENCOUNTER — TELEPHONE (OUTPATIENT)
Dept: FAMILY MEDICINE | Facility: CLINIC | Age: 70
End: 2020-11-03

## 2020-11-03 DIAGNOSIS — Z12.31 ENCOUNTER FOR SCREENING MAMMOGRAM FOR BREAST CANCER: Primary | ICD-10-CM

## 2020-11-03 NOTE — TELEPHONE ENCOUNTER
----- Message from Dahlia Corea MD sent at 11/3/2020 10:13 AM CST -----  Patient informed of mild degenerative changes in the spine.  Advised to discontinue Omeprazole and substitute Maalox or Tums as needed for heartburn.

## 2020-11-10 ENCOUNTER — HOSPITAL ENCOUNTER (OUTPATIENT)
Dept: RADIOLOGY | Facility: HOSPITAL | Age: 70
Discharge: HOME | End: 2020-11-10
Attending: FAMILY MEDICINE
Payer: MEDICARE

## 2020-11-10 DIAGNOSIS — Z12.31 ENCOUNTER FOR SCREENING MAMMOGRAM FOR BREAST CANCER: ICD-10-CM

## 2020-11-10 PROCEDURE — 77063 BREAST TOMOSYNTHESIS BI: CPT | Mod: 26,,, | Performed by: RADIOLOGY

## 2020-11-10 PROCEDURE — 77067 SCR MAMMO BI INCL CAD: CPT | Mod: TC

## 2020-11-10 PROCEDURE — 77067 SCR MAMMO BI INCL CAD: CPT | Mod: 26,,, | Performed by: RADIOLOGY

## 2020-11-10 PROCEDURE — 77063 MAMMO DIGITAL SCREENING BILAT WITH TOMO: ICD-10-PCS | Mod: 26,,, | Performed by: RADIOLOGY

## 2020-11-10 PROCEDURE — 77067 MAMMO DIGITAL SCREENING BILAT WITH TOMO: ICD-10-PCS | Mod: 26,,, | Performed by: RADIOLOGY

## 2020-11-19 ENCOUNTER — PES CALL (OUTPATIENT)
Dept: ADMINISTRATIVE | Facility: CLINIC | Age: 70
End: 2020-11-19

## 2021-03-10 DIAGNOSIS — Z12.11 COLON CANCER SCREENING: ICD-10-CM

## 2021-07-01 ENCOUNTER — OFFICE VISIT (OUTPATIENT)
Dept: FAMILY MEDICINE | Facility: CLINIC | Age: 71
End: 2021-07-01
Payer: MEDICARE

## 2021-07-01 VITALS
WEIGHT: 131.19 LBS | HEIGHT: 62 IN | OXYGEN SATURATION: 97 % | SYSTOLIC BLOOD PRESSURE: 128 MMHG | DIASTOLIC BLOOD PRESSURE: 72 MMHG | TEMPERATURE: 99 F | RESPIRATION RATE: 17 BRPM | HEART RATE: 84 BPM | BODY MASS INDEX: 24.14 KG/M2

## 2021-07-01 DIAGNOSIS — B96.89 ACUTE BACTERIAL BRONCHITIS: Primary | ICD-10-CM
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
## 2021-07-01 DIAGNOSIS — J20.8 ACUTE BACTERIAL BRONCHITIS: Primary | ICD-10-CM

## 2021-07-01 DIAGNOSIS — R06.02 SHORTNESS OF BREATH: ICD-10-CM

## 2021-07-01 DIAGNOSIS — R05.9 COUGH: ICD-10-CM

## 2021-07-01 PROCEDURE — U0003 INFECTIOUS AGENT DETECTION BY NUCLEIC ACID (DNA OR RNA); SEVERE ACUTE RESPIRATORY SYNDROME CORONAVIRUS 2 (SARS-COV-2) (CORONAVIRUS DISEASE [COVID-19]), AMPLIFIED PROBE TECHNIQUE, MAKING USE OF HIGH THROUGHPUT TECHNOLOGIES AS DESCRIBED BY CMS-2020-01-R: HCPCS | Performed by: PHYSICIAN ASSISTANT

## 2021-07-01 PROCEDURE — 99999 PR PBB SHADOW E&M-EST. PATIENT-LVL III: CPT | Mod: PBBFAC,,, | Performed by: PHYSICIAN ASSISTANT

## 2021-07-01 PROCEDURE — 99213 OFFICE O/P EST LOW 20 MIN: CPT | Mod: PBBFAC,PO | Performed by: PHYSICIAN ASSISTANT

## 2021-07-01 PROCEDURE — 99214 OFFICE O/P EST MOD 30 MIN: CPT | Mod: S$PBB,,, | Performed by: PHYSICIAN ASSISTANT

## 2021-07-01 PROCEDURE — 99999 PR PBB SHADOW E&M-EST. PATIENT-LVL III: ICD-10-PCS | Mod: PBBFAC,,, | Performed by: PHYSICIAN ASSISTANT

## 2021-07-01 PROCEDURE — 99214 PR OFFICE/OUTPT VISIT, EST, LEVL IV, 30-39 MIN: ICD-10-PCS | Mod: S$PBB,,, | Performed by: PHYSICIAN ASSISTANT

## 2021-07-01 PROCEDURE — U0005 INFEC AGEN DETEC AMPLI PROBE: HCPCS | Performed by: PHYSICIAN ASSISTANT

## 2021-07-01 RX ORDER — PROMETHAZINE HYDROCHLORIDE AND DEXTROMETHORPHAN HYDROBROMIDE 6.25; 15 MG/5ML; MG/5ML
5 SYRUP ORAL NIGHTLY PRN
Qty: 180 ML | Refills: 0 | Status: SHIPPED | OUTPATIENT
Start: 2021-07-01 | End: 2021-07-11

## 2021-07-01 RX ORDER — AZITHROMYCIN 250 MG/1
TABLET, FILM COATED ORAL
Qty: 6 TABLET | Refills: 0 | Status: SHIPPED | OUTPATIENT
Start: 2021-07-01 | End: 2022-02-07

## 2021-07-01 RX ORDER — ALBUTEROL SULFATE 90 UG/1
2 AEROSOL, METERED RESPIRATORY (INHALATION) EVERY 6 HOURS PRN
Qty: 8 G | Refills: 0 | Status: SHIPPED | OUTPATIENT
Start: 2021-07-01 | End: 2022-02-07

## 2021-07-02 LAB — SARS-COV-2 RNA RESP QL NAA+PROBE: NOT DETECTED

## 2021-11-10 DIAGNOSIS — E78.5 HYPERLIPIDEMIA, UNSPECIFIED HYPERLIPIDEMIA TYPE: ICD-10-CM

## 2021-11-10 DIAGNOSIS — M25.551 PAIN OF BOTH HIP JOINTS: ICD-10-CM

## 2021-11-10 DIAGNOSIS — M25.552 PAIN OF BOTH HIP JOINTS: ICD-10-CM

## 2021-11-10 DIAGNOSIS — F41.8 DEPRESSION WITH ANXIETY: ICD-10-CM

## 2021-11-10 DIAGNOSIS — I10 ESSENTIAL HYPERTENSION: ICD-10-CM

## 2021-11-11 RX ORDER — CITALOPRAM 40 MG/1
TABLET, FILM COATED ORAL
Qty: 90 TABLET | Refills: 0 | Status: SHIPPED | OUTPATIENT
Start: 2021-11-11 | End: 2022-03-03 | Stop reason: SDUPTHER

## 2021-11-11 RX ORDER — BENAZEPRIL HYDROCHLORIDE 40 MG/1
TABLET ORAL
Qty: 90 TABLET | Refills: 0 | Status: SHIPPED | OUTPATIENT
Start: 2021-11-11 | End: 2022-03-03 | Stop reason: SDUPTHER

## 2021-11-11 RX ORDER — ATORVASTATIN CALCIUM 40 MG/1
TABLET, FILM COATED ORAL
Qty: 90 TABLET | Refills: 0 | Status: SHIPPED | OUTPATIENT
Start: 2021-11-11 | End: 2022-03-03 | Stop reason: SDUPTHER

## 2021-11-11 RX ORDER — GABAPENTIN 300 MG/1
CAPSULE ORAL
Qty: 90 CAPSULE | Refills: 0 | Status: SHIPPED | OUTPATIENT
Start: 2021-11-11 | End: 2022-03-03 | Stop reason: SDUPTHER

## 2022-02-07 ENCOUNTER — OFFICE VISIT (OUTPATIENT)
Dept: FAMILY MEDICINE | Facility: CLINIC | Age: 72
End: 2022-02-07
Payer: MEDICARE

## 2022-02-07 VITALS
TEMPERATURE: 99 F | SYSTOLIC BLOOD PRESSURE: 142 MMHG | OXYGEN SATURATION: 98 % | HEIGHT: 62 IN | WEIGHT: 134.06 LBS | HEART RATE: 79 BPM | DIASTOLIC BLOOD PRESSURE: 82 MMHG | RESPIRATION RATE: 16 BRPM | BODY MASS INDEX: 24.67 KG/M2

## 2022-02-07 DIAGNOSIS — Z78.0 POST-MENOPAUSE: ICD-10-CM

## 2022-02-07 DIAGNOSIS — Z12.11 COLON CANCER SCREENING: ICD-10-CM

## 2022-02-07 DIAGNOSIS — Z12.31 ENCOUNTER FOR SCREENING MAMMOGRAM FOR BREAST CANCER: ICD-10-CM

## 2022-02-07 DIAGNOSIS — J40 BRONCHITIS: Primary | ICD-10-CM

## 2022-02-07 DIAGNOSIS — I10 ESSENTIAL HYPERTENSION: ICD-10-CM

## 2022-02-07 DIAGNOSIS — Z23 FLU VACCINE NEED: ICD-10-CM

## 2022-02-07 PROCEDURE — 99214 PR OFFICE/OUTPT VISIT, EST, LEVL IV, 30-39 MIN: ICD-10-PCS | Mod: S$GLB,,, | Performed by: PHYSICIAN ASSISTANT

## 2022-02-07 PROCEDURE — G0008 ADMIN INFLUENZA VIRUS VAC: HCPCS | Mod: S$GLB,,, | Performed by: PHYSICIAN ASSISTANT

## 2022-02-07 PROCEDURE — 99214 OFFICE O/P EST MOD 30 MIN: CPT | Mod: S$GLB,,, | Performed by: PHYSICIAN ASSISTANT

## 2022-02-07 PROCEDURE — G0008 FLU VACCINE - QUADRIVALENT - ADJUVANTED: ICD-10-PCS | Mod: S$GLB,,, | Performed by: PHYSICIAN ASSISTANT

## 2022-02-07 PROCEDURE — 99999 PR PBB SHADOW E&M-EST. PATIENT-LVL V: CPT | Mod: PBBFAC,,, | Performed by: PHYSICIAN ASSISTANT

## 2022-02-07 PROCEDURE — 90694 FLU VACCINE - QUADRIVALENT - ADJUVANTED: ICD-10-PCS | Mod: S$GLB,,, | Performed by: PHYSICIAN ASSISTANT

## 2022-02-07 PROCEDURE — 99999 PR PBB SHADOW E&M-EST. PATIENT-LVL V: ICD-10-PCS | Mod: PBBFAC,,, | Performed by: PHYSICIAN ASSISTANT

## 2022-02-07 PROCEDURE — 90694 VACC AIIV4 NO PRSRV 0.5ML IM: CPT | Mod: S$GLB,,, | Performed by: PHYSICIAN ASSISTANT

## 2022-02-07 RX ORDER — PROMETHAZINE HYDROCHLORIDE AND DEXTROMETHORPHAN HYDROBROMIDE 6.25; 15 MG/5ML; MG/5ML
5 SYRUP ORAL NIGHTLY PRN
Qty: 180 ML | Refills: 0 | Status: SHIPPED | OUTPATIENT
Start: 2022-02-07 | End: 2022-02-17

## 2022-02-07 RX ORDER — ALBUTEROL SULFATE 90 UG/1
2 AEROSOL, METERED RESPIRATORY (INHALATION) EVERY 6 HOURS PRN
Qty: 18 G | Refills: 0 | Status: SHIPPED | OUTPATIENT
Start: 2022-02-07 | End: 2022-10-25 | Stop reason: SDUPTHER

## 2022-02-07 NOTE — PROGRESS NOTES
Subjective:       Patient ID: Christi Coreas is a 71 y.o. female.    Chief Complaint: Shortness of Breath (In the past 3 weeks)    HPI 70 y/o female presents for dry cough. Pt states she had a cold that started 3 weeks ago. Cough is a consistent, dry, lingering cough resulting from a postnasal drip. Pertinent positives include: SOB, cough, headache, chest tightness, nasal congestion, ear congestion, sneezing, eye redness, eye itchiness, sore throat, and sinus pressure/congestion. Pt denies fever, chills, chest pain, palpitations, voice changes, hearing loss. Pt has had relief with Claritin, saline  eye solution, Mucinex-DM. Pt has not increased fluid intake and has been eating mostly soups since cold started. Pt claims she has really bad allergies and tends to get bronchitis once a year.     Hypertension:  Pt presents with chronic hypertension. Today's BP reading was 142/92 at the end of visit. Pt states she has been taking medications daily w/o complications and does not take BP daily with cuff at home. Pt denies chest pain, palpitations. Pt states that she follows a low sodium diet but has been eating soups the last three weeks due to cold.       Review of Systems   Constitutional: Negative for chills, fatigue and fever.   HENT: Positive for nasal congestion, ear pain, postnasal drip, sinus pressure/congestion, sneezing and sore throat. Negative for ear discharge, hearing loss, rhinorrhea and trouble swallowing.    Eyes: Positive for redness, itching and eye dryness. Negative for pain and visual disturbance.   Respiratory: Positive for cough, chest tightness and shortness of breath. Negative for wheezing.    Cardiovascular: Negative for chest pain and palpitations.   Gastrointestinal: Negative for abdominal pain, change in bowel habit and change in bowel habit.   Allergic/Immunologic: Positive for environmental allergies.   Neurological: Positive for headaches. Negative for weakness and light-headedness.          Objective:      Physical Exam  Constitutional:       Appearance: Normal appearance.   HENT:      Head: Normocephalic.      Right Ear: Hearing and external ear normal. No drainage, swelling or tenderness. A middle ear effusion is present.      Left Ear: Hearing and external ear normal. No drainage, swelling or tenderness.      Nose: Nose normal. No congestion or rhinorrhea.      Right Turbinates: Not enlarged.      Left Turbinates: Not enlarged.      Right Sinus: No maxillary sinus tenderness or frontal sinus tenderness.      Left Sinus: No maxillary sinus tenderness or frontal sinus tenderness.      Mouth/Throat:      Mouth: Mucous membranes are dry.      Pharynx: Oropharynx is clear. No oropharyngeal exudate or posterior oropharyngeal erythema.      Tonsils: No tonsillar exudate.   Eyes:      Extraocular Movements: Extraocular movements intact.      Conjunctiva/sclera: Conjunctivae normal.      Pupils: Pupils are equal, round, and reactive to light.   Cardiovascular:      Heart sounds: Normal heart sounds.   Pulmonary:      Breath sounds: Normal breath sounds.   Lymphadenopathy:      Cervical: No cervical adenopathy.   Neurological:      Mental Status: She is alert.         Assessment:       Problem List Items Addressed This Visit    None     Visit Diagnoses     Bronchitis    -  Primary    Relevant Medications    albuterol (VENTOLIN HFA) 90 mcg/actuation inhaler    promethazine-dextromethorphan (PROMETHAZINE-DM) 6.25-15 mg/5 mL Syrp    Flu vaccine need        Relevant Orders    Influenza (FLUAD) - Quadrivalent (Adjuvanted) *Preferred* (65+) (PF) (Completed)    Encounter for screening mammogram for breast cancer        Relevant Orders    Mammo Digital Screening Bilat w/ Ld    Post-menopause        Relevant Orders    DXA Bone Density Spine And Hip    Colon cancer screening        Relevant Orders    Case Request Endoscopy: COLONOSCOPY (Completed)    Essential hypertension              Plan:         Bronchitis  -      albuterol (VENTOLIN HFA) 90 mcg/actuation inhaler; Inhale 2 puffs into the lungs every 6 (six) hours as needed for Wheezing. Rescue  Dispense: 18 g; Refill: 0  -     promethazine-dextromethorphan (PROMETHAZINE-DM) 6.25-15 mg/5 mL Syrp; Take 5 mLs by mouth nightly as needed.  Dispense: 180 mL; Refill: 0  - Pt encouraged to continue OTC Claritin for allergy relief and to increase daily water intake.     Flu vaccine need  -     Influenza (FLUAD) - Quadrivalent (Adjuvanted) *Preferred* (65+) (PF)    Encounter for screening mammogram for breast cancer  -     Mammo Digital Screening Bilat w/ Ld; Future; Expected date: 02/07/2022    Post-menopause  -     DXA Bone Density Spine And Hip; Future; Expected date: 02/07/2022    Colon cancer screening  -     Case Request Endoscopy: COLONOSCOPY    Hypertension        - Pt is to return in two weeks for a nurse's visit to recheck BP.       I hereby acknowledge that I am relying upon documentation authored by a PA student working under my supervision and further I hereby attest that I have verified the student documentation or findings by personally re-performing the physical exam and medical decision making activities of the Evaluation and Management service to be billed.  Leia Gaspar

## 2022-02-07 NOTE — PROGRESS NOTES
Health Maintenance Due   Topic     Colorectal Cancer Screening  Pt will call to schedule when ready    DEXA SCAN  Will help schedule after visit    Shingles Vaccine (3 of 3) hx chickenpox - notified can get vaccine at pharmacy      Influenza Vaccine (1) Okay to get today    Mammogram  Will help schedule after visit    COVID-19 Vaccine (3 - Booster for Moderna series) Consult with Leia

## 2022-02-21 ENCOUNTER — CLINICAL SUPPORT (OUTPATIENT)
Dept: FAMILY MEDICINE | Facility: CLINIC | Age: 72
End: 2022-02-21
Payer: MEDICARE

## 2022-02-21 VITALS
TEMPERATURE: 98 F | OXYGEN SATURATION: 97 % | SYSTOLIC BLOOD PRESSURE: 136 MMHG | HEART RATE: 76 BPM | RESPIRATION RATE: 20 BRPM | DIASTOLIC BLOOD PRESSURE: 78 MMHG

## 2022-02-21 DIAGNOSIS — Z01.30 BP CHECK: Primary | ICD-10-CM

## 2022-02-21 PROCEDURE — 99999 PR PBB SHADOW E&M-EST. PATIENT-LVL II: ICD-10-PCS | Mod: PBBFAC,,,

## 2022-02-21 PROCEDURE — 99999 PR PBB SHADOW E&M-EST. PATIENT-LVL II: CPT | Mod: PBBFAC,,,

## 2022-02-21 NOTE — PROGRESS NOTES
"Christi Coreas 71 y.o. female is here today for Blood Pressure check.   History of HTN yes.    Review of patient's allergies indicates:   Allergen Reactions    Aspirin     Nexium [esomeprazole magnesium] Other (See Comments)     "bad pain"     Creatinine   Date Value Ref Range Status   10/12/2020 0.8 0.5 - 1.4 mg/dL Final     Sodium   Date Value Ref Range Status   10/12/2020 140 136 - 145 mmol/L Final     Potassium   Date Value Ref Range Status   10/12/2020 4.2 3.5 - 5.1 mmol/L Final   ]  Patient verfies taking blood pressure medications on a regular basis at the same time of the day.     Current Outpatient Medications:     albuterol (VENTOLIN HFA) 90 mcg/actuation inhaler, Inhale 2 puffs into the lungs every 6 (six) hours as needed for Wheezing. Rescue, Disp: 18 g, Rfl: 0    alendronate (FOSAMAX) 70 MG tablet, Take 1 tablet (70 mg total) by mouth every 7 days., Disp: 12 tablet, Rfl: 3    atorvastatin (LIPITOR) 40 MG tablet, TAKE 1 TABLET BY MOUTH ONCE DAILY IN THE EVENING, Disp: 90 tablet, Rfl: 0    benazepriL (LOTENSIN) 40 MG tablet, Take 1 tablet by mouth once daily, Disp: 90 tablet, Rfl: 0    citalopram (CELEXA) 40 MG tablet, Take 1 tablet by mouth once daily, Disp: 90 tablet, Rfl: 0    gabapentin (NEURONTIN) 300 MG capsule, TAKE 1 CAPSULE BY MOUTH ONCE DAILY IN THE EVENING, Disp: 90 capsule, Rfl: 0    hydrOXYzine HCl (ATARAX) 25 MG tablet, Take 1 tablet (25 mg total) by mouth 3 (three) times daily as needed for Itching., Disp: 30 tablet, Rfl: 3    levocetirizine (XYZAL) 5 MG tablet, TAKE 1 TABLET BY MOUTH ONCE DAILY IN THE EVENING, Disp: 30 tablet, Rfl: 0    omeprazole (PRILOSEC) 40 MG capsule, Take 1 capsule (40 mg total) by mouth once daily., Disp: 90 capsule, Rfl: 3  Does patient have record of home blood pressure readings n/a. Readings have been averaging n/a   Last dose of blood pressure medication was taken at 8:30am.  Patient is symptomatic    Complains of n/a.    Vitals:    02/21/22 1020 "   BP: 136/78   BP Location: Right arm   Patient Position: Sitting   Pulse: 76   Resp: 20   Temp: 97.7 °F (36.5 °C)   TempSrc: Oral   SpO2: 97%         Leia Randle PA-C informed of nurse visit.

## 2022-03-03 ENCOUNTER — HOSPITAL ENCOUNTER (OUTPATIENT)
Dept: RADIOLOGY | Facility: HOSPITAL | Age: 72
Discharge: HOME | End: 2022-03-03
Attending: PHYSICIAN ASSISTANT
Payer: MEDICARE

## 2022-03-03 ENCOUNTER — HOSPITAL ENCOUNTER (OUTPATIENT)
Dept: RADIOLOGY | Facility: CLINIC | Age: 72
Discharge: HOME | End: 2022-03-03
Attending: PHYSICIAN ASSISTANT
Payer: MEDICARE

## 2022-03-03 DIAGNOSIS — Z78.0 POST-MENOPAUSE: ICD-10-CM

## 2022-03-03 DIAGNOSIS — M25.552 PAIN OF BOTH HIP JOINTS: ICD-10-CM

## 2022-03-03 DIAGNOSIS — I10 ESSENTIAL HYPERTENSION: ICD-10-CM

## 2022-03-03 DIAGNOSIS — M25.551 PAIN OF BOTH HIP JOINTS: ICD-10-CM

## 2022-03-03 DIAGNOSIS — F41.8 DEPRESSION WITH ANXIETY: ICD-10-CM

## 2022-03-03 DIAGNOSIS — Z12.31 ENCOUNTER FOR SCREENING MAMMOGRAM FOR BREAST CANCER: ICD-10-CM

## 2022-03-03 DIAGNOSIS — E78.5 HYPERLIPIDEMIA, UNSPECIFIED HYPERLIPIDEMIA TYPE: ICD-10-CM

## 2022-03-03 PROCEDURE — 77067 MAMMO DIGITAL SCREENING BILAT WITH TOMO: ICD-10-PCS | Mod: 26,,, | Performed by: RADIOLOGY

## 2022-03-03 PROCEDURE — 77067 SCR MAMMO BI INCL CAD: CPT | Mod: TC,PO

## 2022-03-03 PROCEDURE — 77080 DEXA BONE DENSITY SPINE HIP: ICD-10-PCS | Mod: 26,,, | Performed by: INTERNAL MEDICINE

## 2022-03-03 PROCEDURE — 77080 DXA BONE DENSITY AXIAL: CPT | Mod: TC,PO

## 2022-03-03 PROCEDURE — 77067 SCR MAMMO BI INCL CAD: CPT | Mod: 26,,, | Performed by: RADIOLOGY

## 2022-03-03 PROCEDURE — 77063 BREAST TOMOSYNTHESIS BI: CPT | Mod: TC,PO

## 2022-03-03 PROCEDURE — 77080 DXA BONE DENSITY AXIAL: CPT | Mod: 26,,, | Performed by: INTERNAL MEDICINE

## 2022-03-03 PROCEDURE — 77063 MAMMO DIGITAL SCREENING BILAT WITH TOMO: ICD-10-PCS | Mod: 26,,, | Performed by: RADIOLOGY

## 2022-03-03 PROCEDURE — 77063 BREAST TOMOSYNTHESIS BI: CPT | Mod: 26,,, | Performed by: RADIOLOGY

## 2022-03-03 RX ORDER — ATORVASTATIN CALCIUM 40 MG/1
TABLET, FILM COATED ORAL
Qty: 90 TABLET | Refills: 0 | Status: SHIPPED | OUTPATIENT
Start: 2022-03-03 | End: 2022-10-25 | Stop reason: SDUPTHER

## 2022-03-03 RX ORDER — CITALOPRAM 40 MG/1
TABLET, FILM COATED ORAL
Qty: 90 TABLET | Refills: 0 | Status: SHIPPED | OUTPATIENT
Start: 2022-03-03 | End: 2022-10-25 | Stop reason: SDUPTHER

## 2022-03-03 RX ORDER — GABAPENTIN 300 MG/1
CAPSULE ORAL
Qty: 90 CAPSULE | Refills: 0 | Status: SHIPPED | OUTPATIENT
Start: 2022-03-03 | End: 2022-10-25 | Stop reason: SDUPTHER

## 2022-03-03 RX ORDER — BENAZEPRIL HYDROCHLORIDE 40 MG/1
TABLET ORAL
Qty: 90 TABLET | Refills: 0 | Status: SHIPPED | OUTPATIENT
Start: 2022-03-03 | End: 2022-06-02

## 2022-03-03 NOTE — TELEPHONE ENCOUNTER
No new care gaps identified.  Powered by [employer]. Reference number: [id number].   3/03/2022 8:04:19 AM CST

## 2022-03-14 ENCOUNTER — TELEPHONE (OUTPATIENT)
Dept: FAMILY MEDICINE | Facility: CLINIC | Age: 72
End: 2022-03-14
Payer: MEDICARE

## 2022-03-14 NOTE — TELEPHONE ENCOUNTER
----- Message from Leia Radnle PA-C sent at 3/11/2022  7:58 AM CST -----  Dexa shows osteoporosis with very high risk for fracture. Verify if pt is taking fosamax once weekly and how consistent she is with it.

## 2022-03-15 NOTE — TELEPHONE ENCOUNTER
----- Message from Leia Randle PA-C sent at 3/11/2022  7:58 AM CST -----  Dexa shows osteoporosis with very high risk for fracture. Verify if pt is taking fosamax once weekly and how consistent she is with it.

## 2022-06-02 DIAGNOSIS — I10 ESSENTIAL HYPERTENSION: ICD-10-CM

## 2022-06-02 RX ORDER — BENAZEPRIL HYDROCHLORIDE 40 MG/1
TABLET ORAL
Qty: 90 TABLET | Refills: 0 | Status: SHIPPED | OUTPATIENT
Start: 2022-06-02 | End: 2022-09-06

## 2022-09-01 DIAGNOSIS — I10 ESSENTIAL HYPERTENSION: ICD-10-CM

## 2022-09-06 ENCOUNTER — TELEPHONE (OUTPATIENT)
Dept: FAMILY MEDICINE | Facility: CLINIC | Age: 72
End: 2022-09-06
Payer: MEDICARE

## 2022-09-06 RX ORDER — BENAZEPRIL HYDROCHLORIDE 40 MG/1
TABLET ORAL
Qty: 90 TABLET | Refills: 0 | Status: SHIPPED | OUTPATIENT
Start: 2022-09-06 | End: 2022-10-25 | Stop reason: SDUPTHER

## 2022-09-06 NOTE — TELEPHONE ENCOUNTER
----- Message from Juanita Hall sent at 9/6/2022  9:48 AM CDT -----  Regarding: Refills  benazepriL (LOTENSIN) 40 MG tablet  90 day    [facility] - [place], [place] - [address]

## 2022-09-14 DIAGNOSIS — I10 ESSENTIAL HYPERTENSION, BENIGN: ICD-10-CM

## 2022-10-24 NOTE — PROGRESS NOTES
Health Maintenance Due   Topic     Colorectal Cancer Screening  Pending order      Shingles Vaccine (3 of 3)  hx chicken pox. Notified pt can get vaccine at pharmacy      COVID-19 Vaccine (3 - Booster for Moderna series)     Influenza Vaccine (1) Pt agree to get today

## 2022-10-25 ENCOUNTER — OFFICE VISIT (OUTPATIENT)
Dept: FAMILY MEDICINE | Facility: CLINIC | Age: 72
End: 2022-10-25
Payer: MEDICARE

## 2022-10-25 ENCOUNTER — LAB VISIT (OUTPATIENT)
Dept: LAB | Facility: HOSPITAL | Age: 72
End: 2022-10-25
Attending: FAMILY MEDICINE
Payer: MEDICARE

## 2022-10-25 VITALS
WEIGHT: 128.75 LBS | OXYGEN SATURATION: 98 % | HEIGHT: 62 IN | SYSTOLIC BLOOD PRESSURE: 138 MMHG | TEMPERATURE: 98 F | BODY MASS INDEX: 23.69 KG/M2 | DIASTOLIC BLOOD PRESSURE: 82 MMHG | RESPIRATION RATE: 18 BRPM | HEART RATE: 83 BPM

## 2022-10-25 DIAGNOSIS — Z12.11 ENCOUNTER FOR COLORECTAL CANCER SCREENING: ICD-10-CM

## 2022-10-25 DIAGNOSIS — M89.9 DISORDER OF BONE AND ARTICULAR CARTILAGE: ICD-10-CM

## 2022-10-25 DIAGNOSIS — I10 ESSENTIAL HYPERTENSION: ICD-10-CM

## 2022-10-25 DIAGNOSIS — M25.551 PAIN OF BOTH HIP JOINTS: ICD-10-CM

## 2022-10-25 DIAGNOSIS — Z12.12 ENCOUNTER FOR COLORECTAL CANCER SCREENING: ICD-10-CM

## 2022-10-25 DIAGNOSIS — K21.9 GASTROESOPHAGEAL REFLUX DISEASE, UNSPECIFIED WHETHER ESOPHAGITIS PRESENT: ICD-10-CM

## 2022-10-25 DIAGNOSIS — R73.02 GLUCOSE INTOLERANCE (IMPAIRED GLUCOSE TOLERANCE): ICD-10-CM

## 2022-10-25 DIAGNOSIS — M25.552 PAIN OF BOTH HIP JOINTS: ICD-10-CM

## 2022-10-25 DIAGNOSIS — M94.9 DISORDER OF BONE AND ARTICULAR CARTILAGE: ICD-10-CM

## 2022-10-25 DIAGNOSIS — R06.09 DOE (DYSPNEA ON EXERTION): ICD-10-CM

## 2022-10-25 DIAGNOSIS — F41.8 DEPRESSION WITH ANXIETY: ICD-10-CM

## 2022-10-25 DIAGNOSIS — J40 BRONCHITIS: ICD-10-CM

## 2022-10-25 DIAGNOSIS — E78.5 HYPERLIPIDEMIA, UNSPECIFIED HYPERLIPIDEMIA TYPE: ICD-10-CM

## 2022-10-25 DIAGNOSIS — Z23 INFLUENZA VACCINE NEEDED: ICD-10-CM

## 2022-10-25 DIAGNOSIS — Z00.00 ANNUAL PHYSICAL EXAM: Primary | ICD-10-CM

## 2022-10-25 DIAGNOSIS — I10 ESSENTIAL HYPERTENSION, BENIGN: ICD-10-CM

## 2022-10-25 PROCEDURE — 1159F MED LIST DOCD IN RCRD: CPT | Mod: CPTII,S$GLB,, | Performed by: FAMILY MEDICINE

## 2022-10-25 PROCEDURE — 84443 ASSAY THYROID STIM HORMONE: CPT | Performed by: FAMILY MEDICINE

## 2022-10-25 PROCEDURE — 36415 COLL VENOUS BLD VENIPUNCTURE: CPT | Mod: PO | Performed by: FAMILY MEDICINE

## 2022-10-25 PROCEDURE — 1125F PR PAIN SEVERITY QUANTIFIED, PAIN PRESENT: ICD-10-PCS | Mod: CPTII,S$GLB,, | Performed by: FAMILY MEDICINE

## 2022-10-25 PROCEDURE — 99397 PER PM REEVAL EST PAT 65+ YR: CPT | Mod: GZ,25,S$GLB, | Performed by: FAMILY MEDICINE

## 2022-10-25 PROCEDURE — 3288F FALL RISK ASSESSMENT DOCD: CPT | Mod: CPTII,S$GLB,, | Performed by: FAMILY MEDICINE

## 2022-10-25 PROCEDURE — 1101F PT FALLS ASSESS-DOCD LE1/YR: CPT | Mod: CPTII,S$GLB,, | Performed by: FAMILY MEDICINE

## 2022-10-25 PROCEDURE — 83036 HEMOGLOBIN GLYCOSYLATED A1C: CPT | Performed by: FAMILY MEDICINE

## 2022-10-25 PROCEDURE — G0008 ADMIN INFLUENZA VIRUS VAC: HCPCS | Mod: S$GLB,,, | Performed by: FAMILY MEDICINE

## 2022-10-25 PROCEDURE — 85025 COMPLETE CBC W/AUTO DIFF WBC: CPT | Performed by: FAMILY MEDICINE

## 2022-10-25 PROCEDURE — 3288F PR FALLS RISK ASSESSMENT DOCUMENTED: ICD-10-PCS | Mod: CPTII,S$GLB,, | Performed by: FAMILY MEDICINE

## 2022-10-25 PROCEDURE — 3075F SYST BP GE 130 - 139MM HG: CPT | Mod: CPTII,S$GLB,, | Performed by: FAMILY MEDICINE

## 2022-10-25 PROCEDURE — 1125F AMNT PAIN NOTED PAIN PRSNT: CPT | Mod: CPTII,S$GLB,, | Performed by: FAMILY MEDICINE

## 2022-10-25 PROCEDURE — 80053 COMPREHEN METABOLIC PANEL: CPT | Performed by: FAMILY MEDICINE

## 2022-10-25 PROCEDURE — 99397 PR PREVENTIVE VISIT,EST,65 & OVER: ICD-10-PCS | Mod: GZ,25,S$GLB, | Performed by: FAMILY MEDICINE

## 2022-10-25 PROCEDURE — 99999 PR PBB SHADOW E&M-EST. PATIENT-LVL IV: ICD-10-PCS | Mod: PBBFAC,,, | Performed by: FAMILY MEDICINE

## 2022-10-25 PROCEDURE — 90694 FLU VACCINE - QUADRIVALENT - ADJUVANTED: ICD-10-PCS | Mod: S$GLB,,, | Performed by: FAMILY MEDICINE

## 2022-10-25 PROCEDURE — 4010F PR ACE/ARB THEARPY RXD/TAKEN: ICD-10-PCS | Mod: CPTII,S$GLB,, | Performed by: FAMILY MEDICINE

## 2022-10-25 PROCEDURE — 3075F PR MOST RECENT SYSTOLIC BLOOD PRESS GE 130-139MM HG: ICD-10-PCS | Mod: CPTII,S$GLB,, | Performed by: FAMILY MEDICINE

## 2022-10-25 PROCEDURE — 1159F PR MEDICATION LIST DOCUMENTED IN MEDICAL RECORD: ICD-10-PCS | Mod: CPTII,S$GLB,, | Performed by: FAMILY MEDICINE

## 2022-10-25 PROCEDURE — 3079F PR MOST RECENT DIASTOLIC BLOOD PRESSURE 80-89 MM HG: ICD-10-PCS | Mod: CPTII,S$GLB,, | Performed by: FAMILY MEDICINE

## 2022-10-25 PROCEDURE — 80061 LIPID PANEL: CPT | Performed by: FAMILY MEDICINE

## 2022-10-25 PROCEDURE — 1101F PR PT FALLS ASSESS DOC 0-1 FALLS W/OUT INJ PAST YR: ICD-10-PCS | Mod: CPTII,S$GLB,, | Performed by: FAMILY MEDICINE

## 2022-10-25 PROCEDURE — 4010F ACE/ARB THERAPY RXD/TAKEN: CPT | Mod: CPTII,S$GLB,, | Performed by: FAMILY MEDICINE

## 2022-10-25 PROCEDURE — G0008 FLU VACCINE - QUADRIVALENT - ADJUVANTED: ICD-10-PCS | Mod: S$GLB,,, | Performed by: FAMILY MEDICINE

## 2022-10-25 PROCEDURE — 3079F DIAST BP 80-89 MM HG: CPT | Mod: CPTII,S$GLB,, | Performed by: FAMILY MEDICINE

## 2022-10-25 PROCEDURE — 90694 VACC AIIV4 NO PRSRV 0.5ML IM: CPT | Mod: S$GLB,,, | Performed by: FAMILY MEDICINE

## 2022-10-25 PROCEDURE — 99999 PR PBB SHADOW E&M-EST. PATIENT-LVL IV: CPT | Mod: PBBFAC,,, | Performed by: FAMILY MEDICINE

## 2022-10-25 RX ORDER — GABAPENTIN 300 MG/1
300 CAPSULE ORAL NIGHTLY
Qty: 90 CAPSULE | Refills: 1 | Status: SHIPPED | OUTPATIENT
Start: 2022-10-25 | End: 2023-11-29 | Stop reason: SDUPTHER

## 2022-10-25 RX ORDER — ALENDRONATE SODIUM 70 MG/1
70 TABLET ORAL
Qty: 12 TABLET | Refills: 3 | Status: SHIPPED | OUTPATIENT
Start: 2022-10-25 | End: 2024-02-27

## 2022-10-25 RX ORDER — CITALOPRAM 40 MG/1
40 TABLET, FILM COATED ORAL DAILY
Qty: 90 TABLET | Refills: 3 | Status: SHIPPED | OUTPATIENT
Start: 2022-10-25 | End: 2024-02-27

## 2022-10-25 RX ORDER — ALBUTEROL SULFATE 90 UG/1
2 AEROSOL, METERED RESPIRATORY (INHALATION) EVERY 6 HOURS PRN
Qty: 18 G | Refills: 5 | Status: SHIPPED | OUTPATIENT
Start: 2022-10-25

## 2022-10-25 RX ORDER — ATORVASTATIN CALCIUM 40 MG/1
40 TABLET, FILM COATED ORAL NIGHTLY
Qty: 90 TABLET | Refills: 3 | Status: SHIPPED | OUTPATIENT
Start: 2022-10-25

## 2022-10-25 RX ORDER — BENAZEPRIL HYDROCHLORIDE 40 MG/1
40 TABLET ORAL DAILY
Qty: 90 TABLET | Refills: 3 | Status: SHIPPED | OUTPATIENT
Start: 2022-10-25 | End: 2023-11-27 | Stop reason: SDUPTHER

## 2022-10-25 NOTE — PROGRESS NOTES
Subjective:       Patient ID: Christi Coreas is a 71 y.o. female.    Chief Complaint: Annual Exam and Establish Care      HPI  72 yo female presents for annual exam. Has mid back pain. Has b/l ear pain. Denies any f/c. Endorses parker. States it started a few months ago.    Review of Systems   Constitutional: Negative.    HENT: Negative.     Respiratory: Negative.     Cardiovascular: Negative.    Gastrointestinal: Negative.    Endocrine: Negative.    Genitourinary: Negative.    Musculoskeletal:  Positive for back pain.   Neurological: Negative.    Psychiatric/Behavioral: Negative.          Past Medical History:   Diagnosis Date    Degenerative disc disease     lumbar    Glucose intolerance (impaired glucose tolerance)     Hypertension      Past Surgical History:   Procedure Laterality Date    APPENDECTOMY      HYSTERECTOMY      OVARIAN CYST SURGERY       Family History   Problem Relation Age of Onset    Cancer Mother     Hypertension Father     Cancer Sister     Diabetes Sister     Breast cancer Sister     Breast cancer Maternal Aunt      Social History     Socioeconomic History    Marital status:    Tobacco Use    Smoking status: Never    Smokeless tobacco: Never   Substance and Sexual Activity    Alcohol use: Yes     Comment: occacionally wine    Drug use: No       Current Outpatient Medications:     hydrOXYzine HCl (ATARAX) 25 MG tablet, Take 1 tablet (25 mg total) by mouth 3 (three) times daily as needed for Itching., Disp: 30 tablet, Rfl: 3    levocetirizine (XYZAL) 5 MG tablet, TAKE 1 TABLET BY MOUTH ONCE DAILY IN THE EVENING, Disp: 30 tablet, Rfl: 0    omeprazole (PRILOSEC) 40 MG capsule, Take 1 capsule (40 mg total) by mouth once daily., Disp: 90 capsule, Rfl: 3    albuterol (VENTOLIN HFA) 90 mcg/actuation inhaler, Inhale 2 puffs into the lungs every 6 (six) hours as needed for Wheezing. Rescue, Disp: 18 g, Rfl: 5    alendronate (FOSAMAX) 70 MG tablet, Take 1 tablet (70 mg total) by mouth every 7 days.,  "Disp: 12 tablet, Rfl: 3    atorvastatin (LIPITOR) 40 MG tablet, Take 1 tablet (40 mg total) by mouth every evening., Disp: 90 tablet, Rfl: 3    benazepriL (LOTENSIN) 40 MG tablet, Take 1 tablet (40 mg total) by mouth once daily., Disp: 90 tablet, Rfl: 3    citalopram (CELEXA) 40 MG tablet, Take 1 tablet (40 mg total) by mouth once daily., Disp: 90 tablet, Rfl: 3    gabapentin (NEURONTIN) 300 MG capsule, Take 1 capsule (300 mg total) by mouth every evening., Disp: 90 capsule, Rfl: 1   Objective:      Vitals:    10/25/22 1310   BP: 138/82   BP Location: Left arm   Patient Position: Sitting   BP Method: Small (Manual)   Pulse: 83   Resp: 18   Temp: 97.9 °F (36.6 °C)   TempSrc: Oral   SpO2: 98%   Weight: 58.4 kg (128 lb 12 oz)   Height: 5' 2" (1.575 m)       Physical Exam  Constitutional:       General: She is not in acute distress.  HENT:      Head: Normocephalic and atraumatic.   Eyes:      Conjunctiva/sclera: Conjunctivae normal.   Cardiovascular:      Rate and Rhythm: Normal rate and regular rhythm.      Heart sounds: Normal heart sounds. No murmur heard.    No friction rub. No gallop.   Pulmonary:      Effort: Pulmonary effort is normal.      Breath sounds: Normal breath sounds. No wheezing or rales.   Musculoskeletal:         General: No tenderness.      Cervical back: Neck supple.   Skin:     General: Skin is warm and dry.   Neurological:      Mental Status: She is alert and oriented to person, place, and time.   Psychiatric:         Behavior: Behavior normal.         Thought Content: Thought content normal.         Judgment: Judgment normal.          Assessment:       1. Annual physical exam    2. Encounter for colorectal cancer screening    3. Essential hypertension, benign    4. Glucose intolerance (impaired glucose tolerance)    5. Gastroesophageal reflux disease, unspecified whether esophagitis present    6. Bronchitis    7. Disorder of bone and articular cartilage    8. Hyperlipidemia, unspecified " hyperlipidemia type    9. Essential hypertension    10. Depression with anxiety    11. Pain of both hip joints    12. Influenza vaccine needed    13. SON (dyspnea on exertion)          Plan:       Annual physical exam    Encounter for colorectal cancer screening  -     Ambulatory referral/consult to Endo Procedure ; Future; Expected date: 10/25/2022    Essential hypertension, benign    Glucose intolerance (impaired glucose tolerance)  -     CBC Auto Differential; Future; Expected date: 10/25/2022  -     Comprehensive Metabolic Panel; Future; Expected date: 10/25/2022  -     Hemoglobin A1C; Future; Expected date: 10/25/2022  -     TSH; Future; Expected date: 10/25/2022  -     Lipid Panel; Future; Expected date: 10/25/2022    Gastroesophageal reflux disease, unspecified whether esophagitis present    Bronchitis  -     albuterol (VENTOLIN HFA) 90 mcg/actuation inhaler; Inhale 2 puffs into the lungs every 6 (six) hours as needed for Wheezing. Rescue  Dispense: 18 g; Refill: 5    Disorder of bone and articular cartilage  -     alendronate (FOSAMAX) 70 MG tablet; Take 1 tablet (70 mg total) by mouth every 7 days.  Dispense: 12 tablet; Refill: 3    Hyperlipidemia, unspecified hyperlipidemia type  -     CBC Auto Differential; Future; Expected date: 10/25/2022  -     Comprehensive Metabolic Panel; Future; Expected date: 10/25/2022  -     Hemoglobin A1C; Future; Expected date: 10/25/2022  -     TSH; Future; Expected date: 10/25/2022  -     Lipid Panel; Future; Expected date: 10/25/2022  -     atorvastatin (LIPITOR) 40 MG tablet; Take 1 tablet (40 mg total) by mouth every evening.  Dispense: 90 tablet; Refill: 3    Essential hypertension  -     CBC Auto Differential; Future; Expected date: 10/25/2022  -     Comprehensive Metabolic Panel; Future; Expected date: 10/25/2022  -     Hemoglobin A1C; Future; Expected date: 10/25/2022  -     TSH; Future; Expected date: 10/25/2022  -     Lipid Panel; Future; Expected date:  10/25/2022  -     benazepriL (LOTENSIN) 40 MG tablet; Take 1 tablet (40 mg total) by mouth once daily.  Dispense: 90 tablet; Refill: 3    Depression with anxiety  -     citalopram (CELEXA) 40 MG tablet; Take 1 tablet (40 mg total) by mouth once daily.  Dispense: 90 tablet; Refill: 3    Pain of both hip joints  -     gabapentin (NEURONTIN) 300 MG capsule; Take 1 capsule (300 mg total) by mouth every evening.  Dispense: 90 capsule; Refill: 1    Influenza vaccine needed  -     Influenza (FLUAD) - Quadrivalent (Adjuvanted) *Preferred* (65+) (PF)    SON (dyspnea on exertion)  -     Ambulatory referral/consult to Cardiology; Future; Expected date: 11/01/2022    Follow-up labs.  Referred to Cards        No future appointments.    Patient note was created using [employer].  Any errors in syntax or even information may not have been identified and edited on initial review prior to signing this note.

## 2022-10-26 LAB
ALBUMIN SERPL BCP-MCNC: 4.3 G/DL (ref 3.5–5.2)
ALP SERPL-CCNC: 79 U/L (ref 55–135)
ALT SERPL W/O P-5'-P-CCNC: 17 U/L (ref 10–44)
ANION GAP SERPL CALC-SCNC: 13 MMOL/L (ref 8–16)
AST SERPL-CCNC: 28 U/L (ref 10–40)
BASOPHILS # BLD AUTO: 0.07 K/UL (ref 0–0.2)
BASOPHILS NFR BLD: 1.2 % (ref 0–1.9)
BILIRUB SERPL-MCNC: 0.3 MG/DL (ref 0.1–1)
BUN SERPL-MCNC: 19 MG/DL (ref 8–23)
CALCIUM SERPL-MCNC: 9.9 MG/DL (ref 8.7–10.5)
CHLORIDE SERPL-SCNC: 104 MMOL/L (ref 95–110)
CHOLEST SERPL-MCNC: 259 MG/DL (ref 120–199)
CHOLEST/HDLC SERPL: 4.1 {RATIO} (ref 2–5)
CO2 SERPL-SCNC: 23 MMOL/L (ref 23–29)
CREAT SERPL-MCNC: 1 MG/DL (ref 0.5–1.4)
DIFFERENTIAL METHOD: ABNORMAL
EOSINOPHIL # BLD AUTO: 0.1 K/UL (ref 0–0.5)
EOSINOPHIL NFR BLD: 2.3 % (ref 0–8)
ERYTHROCYTE [DISTWIDTH] IN BLOOD BY AUTOMATED COUNT: 12.6 % (ref 11.5–14.5)
EST. GFR  (NO RACE VARIABLE): 59.5 ML/MIN/1.73 M^2
ESTIMATED AVG GLUCOSE: 126 MG/DL (ref 68–131)
GLUCOSE SERPL-MCNC: 95 MG/DL (ref 70–110)
HBA1C MFR BLD: 6 % (ref 4–5.6)
HCT VFR BLD AUTO: 39.2 % (ref 37–48.5)
HDLC SERPL-MCNC: 63 MG/DL (ref 40–75)
HDLC SERPL: 24.3 % (ref 20–50)
HGB BLD-MCNC: 12.4 G/DL (ref 12–16)
IMM GRANULOCYTES # BLD AUTO: 0.01 K/UL (ref 0–0.04)
IMM GRANULOCYTES NFR BLD AUTO: 0.2 % (ref 0–0.5)
LDLC SERPL CALC-MCNC: 168.8 MG/DL (ref 63–159)
LYMPHOCYTES # BLD AUTO: 1.4 K/UL (ref 1–4.8)
LYMPHOCYTES NFR BLD: 24.5 % (ref 18–48)
MCH RBC QN AUTO: 30.2 PG (ref 27–31)
MCHC RBC AUTO-ENTMCNC: 31.6 G/DL (ref 32–36)
MCV RBC AUTO: 96 FL (ref 82–98)
MONOCYTES # BLD AUTO: 0.6 K/UL (ref 0.3–1)
MONOCYTES NFR BLD: 10.2 % (ref 4–15)
NEUTROPHILS # BLD AUTO: 3.5 K/UL (ref 1.8–7.7)
NEUTROPHILS NFR BLD: 61.6 % (ref 38–73)
NONHDLC SERPL-MCNC: 196 MG/DL
NRBC BLD-RTO: 0 /100 WBC
PLATELET # BLD AUTO: 221 K/UL (ref 150–450)
PMV BLD AUTO: 10.9 FL (ref 9.2–12.9)
POTASSIUM SERPL-SCNC: 3.8 MMOL/L (ref 3.5–5.1)
PROT SERPL-MCNC: 7.6 G/DL (ref 6–8.4)
RBC # BLD AUTO: 4.1 M/UL (ref 4–5.4)
SODIUM SERPL-SCNC: 140 MMOL/L (ref 136–145)
TRIGL SERPL-MCNC: 136 MG/DL (ref 30–150)
TSH SERPL DL<=0.005 MIU/L-ACNC: 2.67 UIU/ML (ref 0.4–4)
WBC # BLD AUTO: 5.71 K/UL (ref 3.9–12.7)

## 2022-10-27 ENCOUNTER — TELEPHONE (OUTPATIENT)
Dept: FAMILY MEDICINE | Facility: CLINIC | Age: 72
End: 2022-10-27
Payer: MEDICARE

## 2022-10-27 DIAGNOSIS — E78.5 HYPERLIPIDEMIA, UNSPECIFIED HYPERLIPIDEMIA TYPE: ICD-10-CM

## 2022-10-27 DIAGNOSIS — R73.03 PREDIABETES: Primary | ICD-10-CM

## 2022-10-27 NOTE — LETTER
November 2, 2022    Christi Coreas  [address]  [place] [address]             [facility]  [address]  [place] [address]  Phone: [phone]  Fax: [phone] Dear Ms. Coreas:    Below are the results from your recent visit:    Resulted Orders   CBC Auto Differential   Result Value Ref Range    WBC 5.71 3.90 - 12.70 K/uL    RBC 4.10 4.00 - 5.40 M/uL    Hemoglobin 12.4 12.0 - 16.0 g/dL    Hematocrit 39.2 37.0 - 48.5 %    MCV 96 82 - 98 fL    MCH 30.2 27.0 - 31.0 pg    MCHC 31.6 (L) 32.0 - 36.0 g/dL    RDW 12.6 11.5 - 14.5 %    Platelets 221 150 - 450 K/uL    MPV 10.9 9.2 - 12.9 fL    Immature Granulocytes 0.2 0.0 - 0.5 %    Gran # (ANC) 3.5 1.8 - 7.7 K/uL    Immature Grans (Abs) 0.01 0.00 - 0.04 K/uL      Comment:      Mild elevation in immature granulocytes is non specific and   can be seen in a variety of conditions including stress response,   acute inflammation, trauma and pregnancy. Correlation with other   laboratory and clinical findings is essential.      Lymph # 1.4 1.0 - 4.8 K/uL    Mono # 0.6 0.3 - 1.0 K/uL    Eos # 0.1 0.0 - 0.5 K/uL    Baso # 0.07 0.00 - 0.20 K/uL    nRBC 0 0 /100 WBC    Gran % 61.6 38.0 - 73.0 %    Lymph % 24.5 18.0 - 48.0 %    Mono % 10.2 4.0 - 15.0 %    Eosinophil % 2.3 0.0 - 8.0 %    Basophil % 1.2 0.0 - 1.9 %    Differential Method Automated    Comprehensive Metabolic Panel   Result Value Ref Range    Sodium 140 136 - 145 mmol/L    Potassium 3.8 3.5 - 5.1 mmol/L    Chloride 104 95 - 110 mmol/L    CO2 23 23 - 29 mmol/L    Glucose 95 70 - 110 mg/dL    BUN 19 8 - 23 mg/dL    Creatinine 1.0 0.5 - 1.4 mg/dL    Calcium 9.9 8.7 - 10.5 mg/dL    Total Protein 7.6 6.0 - 8.4 g/dL    Albumin 4.3 3.5 - 5.2 g/dL    Total Bilirubin 0.3 0.1 - 1.0 mg/dL      Comment:      For infants and newborns, interpretation of results should be based  on gestational age, weight and in agreement with clinical  observations.    Premature Infant recommended reference ranges:  Up  to 24 hours.............<8.0 mg/dL  Up to 48 hours............<12.0 mg/dL  3-5 days..................<15.0 mg/dL  6-29 days.................<15.0 mg/dL      Alkaline Phosphatase 79 55 - 135 U/L    AST 28 10 - 40 U/L    ALT 17 10 - 44 U/L    Anion Gap 13 8 - 16 mmol/L    eGFR 59.5 (A) >60 mL/min/1.73 m^2   Hemoglobin A1C   Result Value Ref Range    Hemoglobin A1C 6.0 (H) 4.0 - 5.6 %      Comment:      ADA Screening Guidelines:  5.7-6.4%  Consistent with prediabetes  >or=6.5%  Consistent with diabetes    High levels of fetal hemoglobin interfere with the HbA1C  assay. Heterozygous hemoglobin variants (HbS, HgC, etc)do  not significantly interfere with this assay.   However, presence of multiple variants may affect accuracy.      Estimated Avg Glucose 126 68 - 131 mg/dL   TSH   Result Value Ref Range    TSH 2.674 0.400 - 4.000 uIU/mL   Lipid Panel   Result Value Ref Range    Cholesterol 259 (H) 120 - 199 mg/dL      Comment:      The National Cholesterol Education Program (NCEP) has set the  following guidelines (reference ranges) for Cholesterol:  Optimal.....................<200 mg/dL  Borderline High.............200-239 mg/dL  High........................> or = 240 mg/dL      Triglycerides 136 30 - 150 mg/dL      Comment:      The National Cholesterol Education Program (NCEP) has set the  following guidelines (reference values) for triglycerides:  Normal......................<150 mg/dL  Borderline High.............150-199 mg/dL  High........................200-499 mg/dL      HDL 63 40 - 75 mg/dL      Comment:      The National Cholesterol Education Program (NCEP) has set the  following guidelines (reference values) for HDL Cholesterol:  Low...............<40 mg/dL  Optimal...........>60 mg/dL      LDL Cholesterol 168.8 (H) 63.0 - 159.0 mg/dL      Comment:      The National Cholesterol Education Program (NCEP) has set the  following guidelines (reference values) for LDL Cholesterol:  Optimal.......................<130  mg/dL  Borderline High...............130-159 mg/dL  High..........................160-189 mg/dL  Very High.....................>190 mg/dL      HDL/Cholesterol Ratio 24.3 20.0 - 50.0 %    Total Cholesterol/HDL Ratio 4.1 2.0 - 5.0    Non-HDL Cholesterol 196 mg/dL      Comment:      Risk category and Non-HDL cholesterol goals:  Coronary heart disease (CHD)or equivalent (10-year risk of CHD >20%):  Non-HDL cholesterol goal     <130 mg/dL  Two or more CHD risk factors and 10-year risk of CHD <= 20%:  Non-HDL cholesterol goal     <160 mg/dL  0 to 1 CHD risk factor:  Non-HDL cholesterol goal     <190 mg/dL       Your results are abnormal. Your cholesterol has worsened. Recheck in 6 months. If it continues to be elevated will need to increase cholesterol medication. Otherwise labs are stable. Cont meds. Please schedule as a fasting lab.        Sincerely,        Anil Medeiros MD

## 2022-10-27 NOTE — TELEPHONE ENCOUNTER
Chol worsened. Recheck in 6 months. If it continues to be elevated will need to increase chol med. Otherwise labs are stable. Cont meds. Please schedule as a fasting lab.

## 2023-04-27 ENCOUNTER — TELEPHONE (OUTPATIENT)
Dept: FAMILY MEDICINE | Facility: CLINIC | Age: 73
End: 2023-04-27
Payer: MEDICARE

## 2023-05-03 ENCOUNTER — PES CALL (OUTPATIENT)
Dept: ADMINISTRATIVE | Facility: CLINIC | Age: 73
End: 2023-05-03
Payer: MEDICARE

## 2023-06-29 ENCOUNTER — PES CALL (OUTPATIENT)
Dept: ADMINISTRATIVE | Facility: CLINIC | Age: 73
End: 2023-06-29
Payer: MEDICARE

## 2023-08-07 ENCOUNTER — TELEPHONE (OUTPATIENT)
Dept: FAMILY MEDICINE | Facility: CLINIC | Age: 73
End: 2023-08-07
Payer: MEDICARE

## 2023-08-07 DIAGNOSIS — Z12.31 ENCOUNTER FOR SCREENING MAMMOGRAM FOR BREAST CANCER: Primary | ICD-10-CM

## 2023-08-07 NOTE — TELEPHONE ENCOUNTER
----- Message from Lluvia Ceja sent at 8/7/2023 11:05 AM CDT -----  Regarding: order  Contact: pt [phone]    Orders  Patient Requesting Order     Order Needed:Mammogram      Communication Preference patient is calling to schedule her annual mammogram

## 2023-08-07 NOTE — TELEPHONE ENCOUNTER
----- Message from Lluvia Ceja sent at 8/7/2023 11:11 AM CDT -----  Regarding: appt  Contact: pt [phone]  PATIENTCALL     Pt is calling to speak with someone in provider office regarding she needs to schedule an appt for a cough there is no appt in epic she is asking for a return call please call pt at [phone]

## 2023-09-19 ENCOUNTER — TELEPHONE (OUTPATIENT)
Dept: FAMILY MEDICINE | Facility: CLINIC | Age: 73
End: 2023-09-19
Payer: MEDICARE

## 2023-09-19 NOTE — TELEPHONE ENCOUNTER
----- Message from Elian Moses sent at 9/18/2023 11:38 AM CDT -----  Regarding: Self  [phone]  Type:  Sooner Appointment Request    Patient is requesting a sooner appointment.  Patient declined first available appointment listed as well as another facility and provider .  Patient will not accept being placed on the waitlist and is requesting a message be sent to doctor.    Name of Caller:  Self     When is the first available appointment?  December 2023     Symptoms:  check up     Would the patient rather a call back or a response via [facility]?  Call back     Best Call Back Number:  [phone]     Additional Information:

## 2023-09-19 NOTE — TELEPHONE ENCOUNTER
Left voicemail advising pt of December being Dr. Medeiros's next availability. Pt schedule with Rene if needing to be seen sooner.

## 2023-09-20 ENCOUNTER — PATIENT MESSAGE (OUTPATIENT)
Dept: ADMINISTRATIVE | Facility: HOSPITAL | Age: 73
End: 2023-09-20
Payer: MEDICARE

## 2023-09-28 ENCOUNTER — HOSPITAL ENCOUNTER (OUTPATIENT)
Dept: RADIOLOGY | Facility: HOSPITAL | Age: 73
Discharge: HOME | End: 2023-09-28
Attending: FAMILY MEDICINE
Payer: MEDICARE

## 2023-09-28 DIAGNOSIS — Z12.31 ENCOUNTER FOR SCREENING MAMMOGRAM FOR BREAST CANCER: ICD-10-CM

## 2023-09-28 PROCEDURE — 77063 BREAST TOMOSYNTHESIS BI: CPT | Mod: 26,,, | Performed by: RADIOLOGY

## 2023-09-28 PROCEDURE — 77067 SCR MAMMO BI INCL CAD: CPT | Mod: 26,,, | Performed by: RADIOLOGY

## 2023-09-28 PROCEDURE — 77063 MAMMO DIGITAL SCREENING BILAT WITH TOMO: ICD-10-PCS | Mod: 26,,, | Performed by: RADIOLOGY

## 2023-09-28 PROCEDURE — 77067 SCR MAMMO BI INCL CAD: CPT | Mod: TC,PO

## 2023-09-28 PROCEDURE — 77067 MAMMO DIGITAL SCREENING BILAT WITH TOMO: ICD-10-PCS | Mod: 26,,, | Performed by: RADIOLOGY

## 2023-11-06 ENCOUNTER — PATIENT MESSAGE (OUTPATIENT)
Dept: ADMINISTRATIVE | Facility: HOSPITAL | Age: 73
End: 2023-11-06
Payer: MEDICARE

## 2023-11-09 ENCOUNTER — PATIENT OUTREACH (OUTPATIENT)
Dept: ADMINISTRATIVE | Facility: HOSPITAL | Age: 73
End: 2023-11-09
Payer: MEDICARE

## 2023-11-09 ENCOUNTER — PATIENT MESSAGE (OUTPATIENT)
Dept: ADMINISTRATIVE | Facility: HOSPITAL | Age: 73
End: 2023-11-09
Payer: MEDICARE

## 2023-11-09 NOTE — PROGRESS NOTES
Population Health Chart Review & Patient Outreach Details    Need to confirm PCP - Due for visit.   Further Action Needed If Patient Returns Outreach:      Left message for patient to return call. Sent [facility] message.       Updates Requested / Reviewed:     [x]  Care Everywhere    []     []  External Sources ([facility], [facility], [facility], etc.)    [] [facility]   [] [facility]   [] Other:    [x]  Care Team Updated   []  Removed  or Duplicate Orders   []  Immunization Reconciliation Completed / Queried    [] [place]   [] [place]   [] [place]   [] [place]      Health Maintenance Topics Addressed and Outreach Outcomes / Actions Taken:             Breast Cancer Screening []  Mammogram Order Placed    []  Mammogram Screening Scheduled    []  External Records Requested & Care Team Updated if Applicable    []  External Records Uploaded & Care Team Updated if Applicable    []  Pt Declined Scheduling Mammogram    []  Pt Will Schedule with External Provider / Order Routed & Care Team Updated if Applicable              Cervical Cancer Screening []  Pap Smear Scheduled in Primary Care or OBGYN    []  External Records Requested & Care Team Updated if Applicable       []  External Records Uploaded, Care Team Updated, & History Updated if Applicable    []  Patient Declined Scheduling Pap Smear    []  Patient Will Schedule with External Provider & Care Team Updated if Applicable                  Colorectal Cancer Screening []  Colonoscopy Case Request / Referral / Home Test Order Placed    []  External Records Requested & Care Team Updated if Applicable    []  External Records Uploaded, Care Team Updated, & History Updated if Applicable    []  Patient Declined Completing Colon Cancer Screening    []  Patient Will Schedule with External Provider & Care Team Updated if Applicable    []  Fit Kit Mailed (add the SmartPhrase under additional notes)    []  Reminded Patient to Complete Home Test                Diabetic Eye Exam  []  Eye Exam Screening Order Placed    []  Eye Camera Scheduled or Optometry/Ophthalmology Referral Placed    []  External Records Requested & Care Team Updated if Applicable    []  External Records Uploaded, Care Team Updated, & History Updated if Applicable    []  Patient Declined Scheduling Eye Exam    []  Patient Will Schedule with External Provider & Care Team Updated if Applicable             Blood Pressure Control []  Primary Care Follow Up Visit Scheduled     []  Remote Blood Pressure Reading Captured    []  Patient Declined Remote Reading or Scheduling Appt - Escalated to PCP    []  Patient Will Call Back or Send Portal Message with Reading                 HbA1c & Other Labs []  Overdue Lab(s) Ordered    []  Overdue Lab(s) Scheduled    []  External Records Uploaded & Care Team Updated if Applicable    []  Primary Care Follow Up Visit Scheduled     []  Reminded Patient to Complete A1c Home Test    []  Patient Declined Scheduling Labs or Will Call Back to Schedule    []  Patient Will Schedule with External Provider / Order Routed, & Care Team Updated if Applicable           Primary Care Appointment []  Primary Care Appt Scheduled    []  Patient Declined Scheduling or Will Call Back to Schedule    []  Pt Established with External Provider, Updated Care Team, & Informed Pt to Notify Payor if Applicable           Medication Adherence /    Statin Use []  Primary Care Appointment Scheduled    []  Patient Reminded to  Prescription    []  Patient Declined, Provider Notified if Needed    []  Sent Provider Message to Review to Evaluate Pt for Statin, Add Exclusion Dx Codes, Document   Exclusion in Problem List, Change Statin Intensity Level to Moderate or High Intensity if Applicable                Osteoporosis Screening []  Dexa Order Placed    []  Dexa Appointment Scheduled    []  External Records Requested & Care Team Updated    []  External Records Uploaded, Care Team Updated, & History Updated if  Applicable    []  Patient Declined Scheduling Dexa or Will Call Back to Schedule    []  Patient Will Schedule with External Provider / Order Routed & Care Team Updated if Applicable       Additional Notes:

## 2023-11-20 ENCOUNTER — PATIENT MESSAGE (OUTPATIENT)
Dept: ADMINISTRATIVE | Facility: HOSPITAL | Age: 73
End: 2023-11-20
Payer: MEDICARE

## 2023-11-22 DIAGNOSIS — I10 ESSENTIAL HYPERTENSION: ICD-10-CM

## 2023-11-22 NOTE — TELEPHONE ENCOUNTER
Care Due:                  Date            Visit Type   Department     Provider  --------------------------------------------------------------------------------                                EP -                              PRIMARY      [facility]  Last Visit: 10-      CARE ([facility])   MEDICINE       Anil Medeiros  Next Visit: None Scheduled  None         None Found                                                            Last  Test          Frequency    Reason                     Performed    Due Date  --------------------------------------------------------------------------------    Office Visit  12 months..  albuterol, alendronate,    10-   10-                             atorvastatin, benazepriL,                             citalopram...............    CMP.........  12 months..  alendronate,               10-   10-                             atorvastatin, benazepriL.    Lipid Panel.  12 months..  atorvastatin.............  10-   10-    Mg Level....  12 months..  alendronate..............  Not Found    Overdue    Phosphate...  12 months..  alendronate..............  Not Found    Overdue    Vitamin D...  12 months..  alendronate..............  Not Found    Overdue    [employer] Embedded Care Due Messages. Reference number: [id number].   11/22/2023 8:03:42 AM CST

## 2023-11-22 NOTE — TELEPHONE ENCOUNTER
Pt given warm blanket, socks, water upon request      Refill Routing Note   Medication(s) are not appropriate for processing by [facility] for the following reason(s):        Required labs outdated  Required vitals outdated    [facility] action(s):  Defer   Requires appointment : Yes     Requires labs : Yes             Appointments  past 12m or future 3m with PCP    Date Provider   Last Visit   10/25/2022 Anil Medeiros MD   Next Visit   Visit date not found Anil Medeiros MD   ED visits in past 90 days: 0        Note composed:8:17 AM 11/22/2023

## 2023-11-27 RX ORDER — BENAZEPRIL HYDROCHLORIDE 40 MG/1
40 TABLET ORAL
Qty: 30 TABLET | Refills: 0 | Status: SHIPPED | OUTPATIENT
Start: 2023-11-27 | End: 2023-11-29 | Stop reason: SDUPTHER

## 2023-11-29 ENCOUNTER — OFFICE VISIT (OUTPATIENT)
Dept: FAMILY MEDICINE | Facility: CLINIC | Age: 73
End: 2023-11-29
Payer: MEDICARE

## 2023-11-29 ENCOUNTER — LAB VISIT (OUTPATIENT)
Dept: LAB | Facility: HOSPITAL | Age: 73
End: 2023-11-29
Attending: INTERNAL MEDICINE
Payer: MEDICARE

## 2023-11-29 VITALS
OXYGEN SATURATION: 98 % | DIASTOLIC BLOOD PRESSURE: 94 MMHG | BODY MASS INDEX: 25.28 KG/M2 | HEART RATE: 76 BPM | WEIGHT: 138.25 LBS | TEMPERATURE: 98 F | SYSTOLIC BLOOD PRESSURE: 174 MMHG

## 2023-11-29 DIAGNOSIS — R79.9 ABNORMAL FINDING OF BLOOD CHEMISTRY, UNSPECIFIED: ICD-10-CM

## 2023-11-29 DIAGNOSIS — I10 ESSENTIAL HYPERTENSION: ICD-10-CM

## 2023-11-29 DIAGNOSIS — I10 ESSENTIAL HYPERTENSION, BENIGN: Primary | ICD-10-CM

## 2023-11-29 DIAGNOSIS — M25.551 PAIN OF BOTH HIP JOINTS: ICD-10-CM

## 2023-11-29 DIAGNOSIS — H65.01 NON-RECURRENT ACUTE SEROUS OTITIS MEDIA OF RIGHT EAR: ICD-10-CM

## 2023-11-29 DIAGNOSIS — M25.552 PAIN OF BOTH HIP JOINTS: ICD-10-CM

## 2023-11-29 DIAGNOSIS — Z00.00 ANNUAL PHYSICAL EXAM: ICD-10-CM

## 2023-11-29 DIAGNOSIS — Z12.11 COLON CANCER SCREENING: ICD-10-CM

## 2023-11-29 LAB
BASOPHILS # BLD AUTO: 0.07 K/UL (ref 0–0.2)
BASOPHILS NFR BLD: 1.2 % (ref 0–1.9)
DIFFERENTIAL METHOD: ABNORMAL
EOSINOPHIL # BLD AUTO: 0.2 K/UL (ref 0–0.5)
EOSINOPHIL NFR BLD: 2.8 % (ref 0–8)
ERYTHROCYTE [DISTWIDTH] IN BLOOD BY AUTOMATED COUNT: 12.6 % (ref 11.5–14.5)
HCT VFR BLD AUTO: 36.9 % (ref 37–48.5)
HGB BLD-MCNC: 11.9 G/DL (ref 12–16)
IMM GRANULOCYTES # BLD AUTO: 0.04 K/UL (ref 0–0.04)
IMM GRANULOCYTES NFR BLD AUTO: 0.7 % (ref 0–0.5)
LYMPHOCYTES # BLD AUTO: 1.8 K/UL (ref 1–4.8)
LYMPHOCYTES NFR BLD: 31.2 % (ref 18–48)
MCH RBC QN AUTO: 29.4 PG (ref 27–31)
MCHC RBC AUTO-ENTMCNC: 32.2 G/DL (ref 32–36)
MCV RBC AUTO: 91 FL (ref 82–98)
MONOCYTES # BLD AUTO: 0.7 K/UL (ref 0.3–1)
MONOCYTES NFR BLD: 11.8 % (ref 4–15)
NEUTROPHILS # BLD AUTO: 3 K/UL (ref 1.8–7.7)
NEUTROPHILS NFR BLD: 52.3 % (ref 38–73)
NRBC BLD-RTO: 0 /100 WBC
PLATELET # BLD AUTO: 226 K/UL (ref 150–450)
PMV BLD AUTO: 11.1 FL (ref 9.2–12.9)
RBC # BLD AUTO: 4.05 M/UL (ref 4–5.4)
WBC # BLD AUTO: 5.7 K/UL (ref 3.9–12.7)

## 2023-11-29 PROCEDURE — 85025 COMPLETE CBC W/AUTO DIFF WBC: CPT | Performed by: INTERNAL MEDICINE

## 2023-11-29 PROCEDURE — 3008F BODY MASS INDEX DOCD: CPT | Mod: CPTII,S$GLB,, | Performed by: INTERNAL MEDICINE

## 2023-11-29 PROCEDURE — 3288F PR FALLS RISK ASSESSMENT DOCUMENTED: ICD-10-PCS | Mod: CPTII,S$GLB,, | Performed by: INTERNAL MEDICINE

## 2023-11-29 PROCEDURE — 1159F PR MEDICATION LIST DOCUMENTED IN MEDICAL RECORD: ICD-10-PCS | Mod: CPTII,S$GLB,, | Performed by: INTERNAL MEDICINE

## 2023-11-29 PROCEDURE — 3288F FALL RISK ASSESSMENT DOCD: CPT | Mod: CPTII,S$GLB,, | Performed by: INTERNAL MEDICINE

## 2023-11-29 PROCEDURE — 36415 COLL VENOUS BLD VENIPUNCTURE: CPT | Mod: PO | Performed by: INTERNAL MEDICINE

## 2023-11-29 PROCEDURE — 4010F ACE/ARB THERAPY RXD/TAKEN: CPT | Mod: CPTII,S$GLB,, | Performed by: INTERNAL MEDICINE

## 2023-11-29 PROCEDURE — 1160F RVW MEDS BY RX/DR IN RCRD: CPT | Mod: CPTII,S$GLB,, | Performed by: INTERNAL MEDICINE

## 2023-11-29 PROCEDURE — 3080F PR MOST RECENT DIASTOLIC BLOOD PRESSURE >= 90 MM HG: ICD-10-PCS | Mod: CPTII,S$GLB,, | Performed by: INTERNAL MEDICINE

## 2023-11-29 PROCEDURE — 4010F PR ACE/ARB THEARPY RXD/TAKEN: ICD-10-PCS | Mod: CPTII,S$GLB,, | Performed by: INTERNAL MEDICINE

## 2023-11-29 PROCEDURE — 1126F AMNT PAIN NOTED NONE PRSNT: CPT | Mod: CPTII,S$GLB,, | Performed by: INTERNAL MEDICINE

## 2023-11-29 PROCEDURE — 99214 OFFICE O/P EST MOD 30 MIN: CPT | Mod: S$GLB,,, | Performed by: INTERNAL MEDICINE

## 2023-11-29 PROCEDURE — 3077F SYST BP >= 140 MM HG: CPT | Mod: CPTII,S$GLB,, | Performed by: INTERNAL MEDICINE

## 2023-11-29 PROCEDURE — 3080F DIAST BP >= 90 MM HG: CPT | Mod: CPTII,S$GLB,, | Performed by: INTERNAL MEDICINE

## 2023-11-29 PROCEDURE — 99214 PR OFFICE/OUTPT VISIT, EST, LEVL IV, 30-39 MIN: ICD-10-PCS | Mod: S$GLB,,, | Performed by: INTERNAL MEDICINE

## 2023-11-29 PROCEDURE — 1126F PR PAIN SEVERITY QUANTIFIED, NO PAIN PRESENT: ICD-10-PCS | Mod: CPTII,S$GLB,, | Performed by: INTERNAL MEDICINE

## 2023-11-29 PROCEDURE — 1160F PR REVIEW ALL MEDS BY PRESCRIBER/CLIN PHARMACIST DOCUMENTED: ICD-10-PCS | Mod: CPTII,S$GLB,, | Performed by: INTERNAL MEDICINE

## 2023-11-29 PROCEDURE — 1101F PR PT FALLS ASSESS DOC 0-1 FALLS W/OUT INJ PAST YR: ICD-10-PCS | Mod: CPTII,S$GLB,, | Performed by: INTERNAL MEDICINE

## 2023-11-29 PROCEDURE — 3008F PR BODY MASS INDEX (BMI) DOCUMENTED: ICD-10-PCS | Mod: CPTII,S$GLB,, | Performed by: INTERNAL MEDICINE

## 2023-11-29 PROCEDURE — 99999 PR PBB SHADOW E&M-EST. PATIENT-LVL IV: ICD-10-PCS | Mod: PBBFAC,,, | Performed by: INTERNAL MEDICINE

## 2023-11-29 PROCEDURE — 1101F PT FALLS ASSESS-DOCD LE1/YR: CPT | Mod: CPTII,S$GLB,, | Performed by: INTERNAL MEDICINE

## 2023-11-29 PROCEDURE — 1159F MED LIST DOCD IN RCRD: CPT | Mod: CPTII,S$GLB,, | Performed by: INTERNAL MEDICINE

## 2023-11-29 PROCEDURE — 3077F PR MOST RECENT SYSTOLIC BLOOD PRESSURE >= 140 MM HG: ICD-10-PCS | Mod: CPTII,S$GLB,, | Performed by: INTERNAL MEDICINE

## 2023-11-29 PROCEDURE — 99999 PR PBB SHADOW E&M-EST. PATIENT-LVL IV: CPT | Mod: PBBFAC,,, | Performed by: INTERNAL MEDICINE

## 2023-11-29 RX ORDER — BENAZEPRIL HYDROCHLORIDE 40 MG/1
40 TABLET ORAL DAILY
Qty: 90 TABLET | Refills: 3 | Status: SHIPPED | OUTPATIENT
Start: 2023-11-29

## 2023-11-29 RX ORDER — AMOXICILLIN 500 MG/1
500 TABLET, FILM COATED ORAL EVERY 12 HOURS
Qty: 14 TABLET | Refills: 0 | Status: SHIPPED | OUTPATIENT
Start: 2023-11-29 | End: 2023-12-06

## 2023-11-29 RX ORDER — GABAPENTIN 300 MG/1
300 CAPSULE ORAL NIGHTLY
Qty: 90 CAPSULE | Refills: 3 | Status: SHIPPED | OUTPATIENT
Start: 2023-11-29

## 2023-11-29 RX ORDER — BENZONATATE 100 MG/1
100 CAPSULE ORAL 3 TIMES DAILY PRN
Qty: 30 CAPSULE | Refills: 0 | Status: SHIPPED | OUTPATIENT
Start: 2023-11-29 | End: 2023-12-09

## 2023-11-29 NOTE — ASSESSMENT & PLAN NOTE
Chronic, uncontrolled. Ran out of meds    - have refilled her benazepril. Advised compliance. Will schedule two week follow up for BP   - agreed to digital medicine

## 2023-11-29 NOTE — ASSESSMENT & PLAN NOTE
Has not been seen by PCP for more than a year. Discussed HCM with patient. She had flu shot recently. Still sick from recent vaccine, will defer the others for now. Patient will get annual labs done. Agreed to colonoscopy. Up to date with mammo and dexa.

## 2023-11-29 NOTE — PROGRESS NOTES
Chief Complaint: Medication Refill and Cough (Pt has had symptoms for longer than a month, pt took robitussin)      Christi Coreas  is a 72 y.o. year old  has a past medical history of Degenerative disc disease, Glucose intolerance (impaired glucose tolerance), and Hypertension. who presents today for med refills     Also reports one month of coughing (productive). Said it started after her flu shot. It went away but then has returned. Has not taken any meds for this.denies fever, chills, sinusitis or SOB. Does have right ear congestion.     Reports that she ran out of meds last week, though most of her meds have refill.     Past Surgical History:   Procedure Laterality Date    APPENDECTOMY      HYSTERECTOMY      OVARIAN CYST SURGERY          Family History   Problem Relation Age of Onset    Cancer Mother     Hypertension Father     Cancer Sister     Diabetes Sister     Breast cancer Sister     Breast cancer Maternal Aunt         Social History     Socioeconomic History    Marital status:    Tobacco Use    Smoking status: Never    Smokeless tobacco: Never   Substance and Sexual Activity    Alcohol use: Yes     Comment: occacionally wine    Drug use: No         Current Outpatient Medications:     albuterol (VENTOLIN HFA) 90 mcg/actuation inhaler, Inhale 2 puffs into the lungs every 6 (six) hours as needed for Wheezing. Rescue, Disp: 18 g, Rfl: 5    alendronate (FOSAMAX) 70 MG tablet, Take 1 tablet (70 mg total) by mouth every 7 days., Disp: 12 tablet, Rfl: 3    atorvastatin (LIPITOR) 40 MG tablet, Take 1 tablet (40 mg total) by mouth every evening., Disp: 90 tablet, Rfl: 3    citalopram (CELEXA) 40 MG tablet, Take 1 tablet (40 mg total) by mouth once daily., Disp: 90 tablet, Rfl: 3    hydrOXYzine HCl (ATARAX) 25 MG tablet, Take 1 tablet (25 mg total) by mouth 3 (three) times daily as needed for Itching., Disp: 30 tablet, Rfl: 3    levocetirizine (XYZAL) 5 MG tablet, TAKE 1 TABLET BY MOUTH ONCE DAILY IN THE  EVENING, Disp: 30 tablet, Rfl: 0    omeprazole (PRILOSEC) 40 MG capsule, Take 1 capsule (40 mg total) by mouth once daily., Disp: 90 capsule, Rfl: 3    amoxicillin (AMOXIL) 500 MG Tab, Take 1 tablet (500 mg total) by mouth every 12 (twelve) hours. for 7 days, Disp: 14 tablet, Rfl: 0    benazepriL (LOTENSIN) 40 MG tablet, Take 1 tablet (40 mg total) by mouth once daily., Disp: 90 tablet, Rfl: 3    benzonatate (TESSALON) 100 MG capsule, Take 1 capsule (100 mg total) by mouth 3 (three) times daily as needed for Cough., Disp: 30 capsule, Rfl: 0    gabapentin (NEURONTIN) 300 MG capsule, Take 1 capsule (300 mg total) by mouth every evening., Disp: 90 capsule, Rfl: 3     Review of Systems   Constitutional:  Negative for chills and fever.   HENT:  Positive for ear pain (congestion, right). Negative for sore throat.    Respiratory:  Positive for cough and sputum production. Negative for shortness of breath.    Cardiovascular:  Negative for chest pain.        Objective:      Vitals:    11/29/23 1413   BP: (!) 174/94   Pulse:    Temp:        Physical Exam  Vitals and nursing note reviewed.   Constitutional:       Appearance: Normal appearance.   HENT:      Head: Normocephalic and atraumatic.      Right Ear: Ear canal and external ear normal. There is no impacted cerumen.      Left Ear: Tympanic membrane, ear canal and external ear normal. There is no impacted cerumen.      Ears:      Comments: Right ear fluid and congestion behind TM     Mouth/Throat:      Mouth: Mucous membranes are moist.      Pharynx: Oropharynx is clear. No oropharyngeal exudate or posterior oropharyngeal erythema.   Cardiovascular:      Rate and Rhythm: Normal rate and regular rhythm.   Pulmonary:      Effort: Pulmonary effort is normal.      Breath sounds: Normal breath sounds. No wheezing or rales.   Skin:     General: Skin is warm and dry.   Neurological:      General: No focal deficit present.      Mental Status: She is alert and oriented to person,  place, and time.   Psychiatric:         Mood and Affect: Mood normal.         Behavior: Behavior normal.          Assessment:       1. Essential hypertension, benign    2. Essential hypertension    3. Non-recurrent acute serous otitis media of right ear    4. Annual physical exam    5. Pain of both hip joints    6. Colon cancer screening    7. Abnormal finding of blood chemistry, unspecified          Plan:   1. Essential hypertension, benign  Assessment & Plan:  Chronic, uncontrolled. Ran out of meds    - have refilled her benazepril. Advised compliance. Will schedule two week follow up for BP   - agreed to digital medicine       2. Essential hypertension  -     benazepriL (LOTENSIN) 40 MG tablet; Take 1 tablet (40 mg total) by mouth once daily.  Dispense: 90 tablet; Refill: 3  -     Hypertension Digital Medicine (HDMP) Enrollment Order    3. Non-recurrent acute serous otitis media of right ear  Assessment & Plan:  Acute, unresolved. As seen on exam findings     - abx as mentioned below    Orders:  -     amoxicillin (AMOXIL) 500 MG Tab; Take 1 tablet (500 mg total) by mouth every 12 (twelve) hours. for 7 days  Dispense: 14 tablet; Refill: 0    4. Annual physical exam  Assessment & Plan:  Has not been seen by PCP for more than a year. Discussed HCM with patient. She had flu shot recently. Still sick from recent vaccine, will defer the others for now. Patient will get annual labs done. Agreed to colonoscopy. Up to date with mammo and dexa.       Orders:  -     CBC Auto Differential; Future; Expected date: 11/29/2023    5. Pain of both hip joints  -     gabapentin (NEURONTIN) 300 MG capsule; Take 1 capsule (300 mg total) by mouth every evening.  Dispense: 90 capsule; Refill: 3    6. Colon cancer screening  -     Ambulatory referral/consult to Endo Procedure ; Future; Expected date: 11/30/2023    7. Abnormal finding of blood chemistry, unspecified  -     CBC Auto Differential; Future; Expected date:  11/29/2023    Other orders  -     benzonatate (TESSALON) 100 MG capsule; Take 1 capsule (100 mg total) by mouth 3 (three) times daily as needed for Cough.  Dispense: 30 capsule; Refill: 0         Follow up in about 4 months (around 3/29/2024) for HTN.

## 2023-11-29 NOTE — PROGRESS NOTES
Health Maintenance Due   Topic     Colorectal Cancer Screening  Consult pcp    RSV Vaccine (Age 60+ and Pregnant patients) (1 - 1-dose 60+ series) Not offered at this office    Shingles Vaccine (3 of 3)  hx chicken pox. Notified pt can get vaccine at pharmacy    Influenza Vaccine (1)     COVID-19 Vaccine (3 - 2023-24 season) Not offered at this office    Hemoglobin A1c (Prediabetes)  Consult pcp

## 2023-11-30 ENCOUNTER — TELEPHONE (OUTPATIENT)
Dept: FAMILY MEDICINE | Facility: CLINIC | Age: 73
End: 2023-11-30
Payer: MEDICARE

## 2023-11-30 NOTE — TELEPHONE ENCOUNTER
----- Message from Katarzyna López MD sent at 11/30/2023  8:12 AM CST -----  Please call the patient and let her know that she is slightly anemic. Will monitor for now. Unfortunately her other labs weren't linked, please help her schedule them. She's back on 12/13/23 for nursing visit. Thank you

## 2023-12-12 ENCOUNTER — TELEPHONE (OUTPATIENT)
Dept: ENDOSCOPY | Facility: HOSPITAL | Age: 73
End: 2023-12-12

## 2023-12-12 ENCOUNTER — CLINICAL SUPPORT (OUTPATIENT)
Dept: ENDOSCOPY | Facility: HOSPITAL | Age: 73
End: 2023-12-12
Attending: INTERNAL MEDICINE
Payer: MEDICARE

## 2023-12-12 DIAGNOSIS — Z12.11 SPECIAL SCREENING FOR MALIGNANT NEOPLASMS, COLON: Primary | ICD-10-CM

## 2023-12-12 DIAGNOSIS — Z12.11 COLON CANCER SCREENING: ICD-10-CM

## 2023-12-12 NOTE — PLAN OF CARE
We attempted to reach you for your scheduled PAT appointment but you were not available. Please contact Endoscopy Scheduling at # [phone]. New refendo created for pt to get a call back

## 2024-01-02 ENCOUNTER — CLINICAL SUPPORT (OUTPATIENT)
Dept: FAMILY MEDICINE | Facility: CLINIC | Age: 74
End: 2024-01-02
Payer: MEDICARE

## 2024-01-02 ENCOUNTER — TELEPHONE (OUTPATIENT)
Dept: FAMILY MEDICINE | Facility: CLINIC | Age: 74
End: 2024-01-02

## 2024-01-02 VITALS — DIASTOLIC BLOOD PRESSURE: 78 MMHG | SYSTOLIC BLOOD PRESSURE: 144 MMHG

## 2024-01-02 DIAGNOSIS — I10 ESSENTIAL HYPERTENSION, BENIGN: Primary | ICD-10-CM

## 2024-01-02 PROCEDURE — 99999 PR PBB SHADOW E&M-EST. PATIENT-LVL I: CPT | Mod: PBBFAC,,,

## 2024-01-02 RX ORDER — AMLODIPINE BESYLATE 5 MG/1
5 TABLET ORAL DAILY
Qty: 30 TABLET | Refills: 11 | Status: SHIPPED | OUTPATIENT
Start: 2024-01-02 | End: 2024-01-02 | Stop reason: SDUPTHER

## 2024-01-02 RX ORDER — AMLODIPINE BESYLATE 5 MG/1
5 TABLET ORAL DAILY
Qty: 30 TABLET | Refills: 0 | Status: SHIPPED | OUTPATIENT
Start: 2024-01-02 | End: 2025-01-01

## 2024-01-02 NOTE — PROGRESS NOTES
Trial course of Norvasc sent to be added to pt's current regimen. RTC in 2-4 weeks for repeat nurse visit BP check.

## 2024-01-02 NOTE — PROGRESS NOTES
"Christi Coreas 73 y.o. female is here today for Blood Pressure check.   History of HTN yes.    Review of patient's allergies indicates:   Allergen Reactions    Aspirin     Nexium [esomeprazole magnesium] Other (See Comments)     "bad pain"     Creatinine   Date Value Ref Range Status   10/25/2022 1.0 0.5 - 1.4 mg/dL Final     Sodium   Date Value Ref Range Status   10/25/2022 140 136 - 145 mmol/L Final     Potassium   Date Value Ref Range Status   10/25/2022 3.8 3.5 - 5.1 mmol/L Final   ]  Patient verifies taking blood pressure medications on a regular basis at the same time of the day.     Current Outpatient Medications:     albuterol (VENTOLIN HFA) 90 mcg/actuation inhaler, Inhale 2 puffs into the lungs every 6 (six) hours as needed for Wheezing. Rescue, Disp: 18 g, Rfl: 5    alendronate (FOSAMAX) 70 MG tablet, Take 1 tablet (70 mg total) by mouth every 7 days., Disp: 12 tablet, Rfl: 3    atorvastatin (LIPITOR) 40 MG tablet, Take 1 tablet (40 mg total) by mouth every evening., Disp: 90 tablet, Rfl: 3    benazepriL (LOTENSIN) 40 MG tablet, Take 1 tablet (40 mg total) by mouth once daily., Disp: 90 tablet, Rfl: 3    citalopram (CELEXA) 40 MG tablet, Take 1 tablet (40 mg total) by mouth once daily., Disp: 90 tablet, Rfl: 3    gabapentin (NEURONTIN) 300 MG capsule, Take 1 capsule (300 mg total) by mouth every evening., Disp: 90 capsule, Rfl: 3    hydrOXYzine HCl (ATARAX) 25 MG tablet, Take 1 tablet (25 mg total) by mouth 3 (three) times daily as needed for Itching., Disp: 30 tablet, Rfl: 3    levocetirizine (XYZAL) 5 MG tablet, TAKE 1 TABLET BY MOUTH ONCE DAILY IN THE EVENING, Disp: 30 tablet, Rfl: 0    omeprazole (PRILOSEC) 40 MG capsule, Take 1 capsule (40 mg total) by mouth once daily., Disp: 90 capsule, Rfl: 3  Does patient have record of home blood pressure readings no. Readings have been averaging not done.   Last dose of blood pressure medication was taken at yesterday.  Patient is asymptomatic.   Complains of " no complaints.    Vitals:    01/02/24 1109 01/02/24 1124   BP: (!) 160/78 (!) 144/78   BP Location: Right arm Right arm   Patient Position: Sitting Sitting   BP Method: Medium (Manual) Medium (Manual)         Dr. López informed of nurse visit.     Pt had not taken medication yet today, was on her way to go to the pharmacy to  new RX

## 2024-01-03 DIAGNOSIS — I10 ESSENTIAL HYPERTENSION, BENIGN: ICD-10-CM

## 2024-02-08 DIAGNOSIS — E11.9 TYPE 2 DIABETES MELLITUS WITHOUT COMPLICATION: ICD-10-CM

## 2024-02-27 DIAGNOSIS — F41.8 DEPRESSION WITH ANXIETY: ICD-10-CM

## 2024-02-27 DIAGNOSIS — M89.9 DISORDER OF BONE AND ARTICULAR CARTILAGE: ICD-10-CM

## 2024-02-27 DIAGNOSIS — M94.9 DISORDER OF BONE AND ARTICULAR CARTILAGE: ICD-10-CM

## 2024-02-27 RX ORDER — CITALOPRAM 40 MG/1
40 TABLET, FILM COATED ORAL
Qty: 90 TABLET | Refills: 0 | Status: SHIPPED | OUTPATIENT
Start: 2024-02-27

## 2024-02-27 RX ORDER — ALENDRONATE SODIUM 70 MG/1
70 TABLET ORAL
Qty: 12 TABLET | Refills: 0 | Status: SHIPPED | OUTPATIENT
Start: 2024-02-27

## 2024-02-27 NOTE — TELEPHONE ENCOUNTER
Care Due:                  Date            Visit Type   Department     Provider  --------------------------------------------------------------------------------                                EP -                              PRIMARY      [facility]  Last Visit: 11-      CARE ([facility])   MEDICINE       Katarzyna López                              EP -                              PRIMARY      [facility]  Next Visit: 03-      CARE ([facility])   MEDICINE       Katarzyna López                                                            Last  Test          Frequency    Reason                     Performed    Due Date  --------------------------------------------------------------------------------    CMP.........  12 months..  alendronate,               10-   10-                             atorvastatin, benazepriL.    Lipid Panel.  12 months..  atorvastatin.............  10-   10-    Mg Level....  12 months..  alendronate..............  Not Found    Overdue    Phosphate...  12 months..  alendronate..............  Not Found    Overdue    Vitamin D...  12 months..  alendronate..............  Not Found    Overdue    [employer] Embedded Care Due Messages. Reference number: [id number].   2/27/2024 9:11:58 AM CST

## 2024-03-01 ENCOUNTER — PATIENT MESSAGE (OUTPATIENT)
Dept: ADMINISTRATIVE | Facility: HOSPITAL | Age: 74
End: 2024-03-01
Payer: MEDICARE

## 2024-03-04 PROBLEM — Z00.00 ANNUAL PHYSICAL EXAM: Status: RESOLVED | Noted: 2023-11-29 | Resolved: 2024-03-04

## 2024-03-26 ENCOUNTER — TELEPHONE (OUTPATIENT)
Dept: FAMILY MEDICINE | Facility: CLINIC | Age: 74
End: 2024-03-26
Payer: MEDICARE

## 2024-03-28 ENCOUNTER — OFFICE VISIT (OUTPATIENT)
Dept: FAMILY MEDICINE | Facility: CLINIC | Age: 74
End: 2024-03-28
Payer: MEDICARE

## 2024-03-28 ENCOUNTER — HOSPITAL ENCOUNTER (OUTPATIENT)
Dept: RADIOLOGY | Facility: CLINIC | Age: 74
Discharge: HOME | End: 2024-03-28
Attending: INTERNAL MEDICINE
Payer: MEDICARE

## 2024-03-28 VITALS
HEART RATE: 80 BPM | BODY MASS INDEX: 25.23 KG/M2 | SYSTOLIC BLOOD PRESSURE: 132 MMHG | TEMPERATURE: 98 F | HEIGHT: 62 IN | WEIGHT: 137.13 LBS | RESPIRATION RATE: 18 BRPM | DIASTOLIC BLOOD PRESSURE: 72 MMHG | OXYGEN SATURATION: 98 %

## 2024-03-28 DIAGNOSIS — Z78.0 POST-MENOPAUSAL: ICD-10-CM

## 2024-03-28 DIAGNOSIS — M81.0 AGE-RELATED OSTEOPOROSIS WITHOUT CURRENT PATHOLOGICAL FRACTURE: ICD-10-CM

## 2024-03-28 DIAGNOSIS — Z00.00 ANNUAL PHYSICAL EXAM: ICD-10-CM

## 2024-03-28 DIAGNOSIS — I10 ESSENTIAL HYPERTENSION, BENIGN: Primary | ICD-10-CM

## 2024-03-28 DIAGNOSIS — R79.9 ABNORMAL FINDING OF BLOOD CHEMISTRY, UNSPECIFIED: ICD-10-CM

## 2024-03-28 DIAGNOSIS — J30.2 SEASONAL ALLERGIC RHINITIS, UNSPECIFIED TRIGGER: ICD-10-CM

## 2024-03-28 PROBLEM — H65.01 NON-RECURRENT ACUTE SEROUS OTITIS MEDIA OF RIGHT EAR: Status: RESOLVED | Noted: 2023-11-29 | Resolved: 2024-03-28

## 2024-03-28 PROCEDURE — 1159F MED LIST DOCD IN RCRD: CPT | Mod: CPTII,S$GLB,, | Performed by: INTERNAL MEDICINE

## 2024-03-28 PROCEDURE — 1101F PT FALLS ASSESS-DOCD LE1/YR: CPT | Mod: CPTII,S$GLB,, | Performed by: INTERNAL MEDICINE

## 2024-03-28 PROCEDURE — 99214 OFFICE O/P EST MOD 30 MIN: CPT | Mod: S$GLB,,, | Performed by: INTERNAL MEDICINE

## 2024-03-28 PROCEDURE — 77080 DXA BONE DENSITY AXIAL: CPT | Mod: 26,,, | Performed by: INTERNAL MEDICINE

## 2024-03-28 PROCEDURE — 1160F RVW MEDS BY RX/DR IN RCRD: CPT | Mod: CPTII,S$GLB,, | Performed by: INTERNAL MEDICINE

## 2024-03-28 PROCEDURE — 1126F AMNT PAIN NOTED NONE PRSNT: CPT | Mod: CPTII,S$GLB,, | Performed by: INTERNAL MEDICINE

## 2024-03-28 PROCEDURE — 77080 DXA BONE DENSITY AXIAL: CPT | Mod: TC,PO

## 2024-03-28 PROCEDURE — 3075F SYST BP GE 130 - 139MM HG: CPT | Mod: CPTII,S$GLB,, | Performed by: INTERNAL MEDICINE

## 2024-03-28 PROCEDURE — 3288F FALL RISK ASSESSMENT DOCD: CPT | Mod: CPTII,S$GLB,, | Performed by: INTERNAL MEDICINE

## 2024-03-28 PROCEDURE — 99999 PR PBB SHADOW E&M-EST. PATIENT-LVL IV: CPT | Mod: PBBFAC,,, | Performed by: INTERNAL MEDICINE

## 2024-03-28 PROCEDURE — 3078F DIAST BP <80 MM HG: CPT | Mod: CPTII,S$GLB,, | Performed by: INTERNAL MEDICINE

## 2024-03-28 RX ORDER — LEVOCETIRIZINE DIHYDROCHLORIDE 5 MG/1
5 TABLET, FILM COATED ORAL NIGHTLY
Qty: 90 TABLET | Refills: 3 | Status: SHIPPED | OUTPATIENT
Start: 2024-03-28

## 2024-03-28 RX ORDER — AMLODIPINE BESYLATE 5 MG/1
5 TABLET ORAL DAILY
Qty: 90 TABLET | Refills: 3 | Status: SHIPPED | OUTPATIENT
Start: 2024-03-28 | End: 2025-03-28

## 2024-03-28 NOTE — PROGRESS NOTES
Health Maintenance Due   Topic     Colorectal Cancer Screening  Information received last visit    RSV Vaccine (Age 60+ and Pregnant patients) (1 - 1-dose 60+ series) Not offered at this Facility    Shingles Vaccine (3 of 3) Hx of chickenpox. Notified pt can get vaccine at pharmacy.    Influenza Vaccine (1) Pt received    COVID-19 Vaccine (3 - 2023-24 season) Not offered at this Facility    Hemoglobin A1c (Prediabetes)  Consult pcp    DEXA Scan

## 2024-03-28 NOTE — PROGRESS NOTES
Chief Complaint: Follow-up, Hip Pain (L hip), and Back Pain (Lower )      Christi Coreas  is a 73 y.o. year old patient who presents today for HTN f/up    BP stable. Reports no complains. Pain controlled with gabapentin.     Past Surgical History:   Procedure Laterality Date    APPENDECTOMY      HYSTERECTOMY      OVARIAN CYST SURGERY          Family History   Problem Relation Age of Onset    Cancer Mother     Hypertension Father     Cancer Sister     Diabetes Sister     Breast cancer Sister     Breast cancer Maternal Aunt         Social History     Socioeconomic History    Marital status:    Tobacco Use    Smoking status: Never    Smokeless tobacco: Never   Substance and Sexual Activity    Alcohol use: Yes     Comment: occacionally wine    Drug use: No         Current Outpatient Medications:     albuterol (VENTOLIN HFA) 90 mcg/actuation inhaler, Inhale 2 puffs into the lungs every 6 (six) hours as needed for Wheezing. Rescue, Disp: 18 g, Rfl: 5    alendronate (FOSAMAX) 70 MG tablet, TAKE ONE TABLET BY MOUTH EVERY 7 DAYS, Disp: 12 tablet, Rfl: 0    atorvastatin (LIPITOR) 40 MG tablet, Take 1 tablet (40 mg total) by mouth every evening., Disp: 90 tablet, Rfl: 3    benazepriL (LOTENSIN) 40 MG tablet, Take 1 tablet (40 mg total) by mouth once daily., Disp: 90 tablet, Rfl: 3    citalopram (CELEXA) 40 MG tablet, Take 1 tablet by mouth once daily, Disp: 90 tablet, Rfl: 0    gabapentin (NEURONTIN) 300 MG capsule, Take 1 capsule (300 mg total) by mouth every evening., Disp: 90 capsule, Rfl: 3    levocetirizine (XYZAL) 5 MG tablet, Take 1 tablet (5 mg total) by mouth every evening., Disp: 90 tablet, Rfl: 3    omeprazole (PRILOSEC) 40 MG capsule, Take 1 capsule (40 mg total) by mouth once daily., Disp: 90 capsule, Rfl: 3    amLODIPine (NORVASC) 5 MG tablet, Take 1 tablet (5 mg total) by mouth once daily., Disp: 90 tablet, Rfl: 3     Review of Systems   HENT:  Negative for congestion.    Eyes:  Negative for blurred  vision.   Respiratory:  Negative for cough and shortness of breath.    Cardiovascular:  Negative for chest pain and leg swelling.   Gastrointestinal:  Negative for abdominal pain, blood in stool, constipation, diarrhea, melena and nausea.   Genitourinary:  Negative for dysuria.   Musculoskeletal:  Negative for joint pain.   Neurological:  Negative for headaches.   Psychiatric/Behavioral:  Negative for depression. The patient is not nervous/anxious.         Objective:      Vitals:    03/28/24 0726   BP: 132/72   Pulse: 80   Resp: 18   Temp: 98.1 °F (36.7 °C)       Physical Exam  Vitals and nursing note reviewed.   Constitutional:       Appearance: Normal appearance.   HENT:      Head: Normocephalic and atraumatic.   Cardiovascular:      Rate and Rhythm: Normal rate and regular rhythm.   Pulmonary:      Effort: Pulmonary effort is normal.      Breath sounds: Normal breath sounds. No wheezing or rales.   Musculoskeletal:         General: Normal range of motion.      Right lower leg: No edema.      Left lower leg: No edema.   Skin:     General: Skin is warm and dry.   Neurological:      General: No focal deficit present.      Mental Status: She is alert and oriented to person, place, and time.   Psychiatric:         Mood and Affect: Mood normal.         Behavior: Behavior normal.          Assessment:       1. Essential hypertension, benign    2. Age-related osteoporosis without current pathological fracture    3. Post-menopausal    4. Seasonal allergic rhinitis, unspecified trigger    5. Annual physical exam    6. Abnormal finding of blood chemistry, unspecified          Plan:   1. Essential hypertension, benign  Assessment & Plan:  Chronic, controlled     - continue amlodipine and lotensin        Orders:  -     amLODIPine (NORVASC) 5 MG tablet; Take 1 tablet (5 mg total) by mouth once daily.  Dispense: 90 tablet; Refill: 3    2. Age-related osteoporosis without current pathological fracture  Assessment & Plan:  Last  dexa in 2022 showed osteoporosis of femur and lumbar spine. Has been on fosamax since 2020    - continue fosamax for now. Will consider bisphos holiday in 2025    Orders:  -     Calcitriol; Future; Expected date: 09/28/2024    3. Post-menopausal  -     DXA Bone Density Axial Skeleton 1 or more sites; Future; Expected date: 03/28/2024    4. Seasonal allergic rhinitis, unspecified trigger  Assessment & Plan:  Chronic, stable     - continue daily xyzal    Orders:  -     levocetirizine (XYZAL) 5 MG tablet; Take 1 tablet (5 mg total) by mouth every evening.  Dispense: 90 tablet; Refill: 3    5. Annual physical exam  -     CBC Auto Differential; Future; Expected date: 09/28/2024  -     Comprehensive Metabolic Panel; Future; Expected date: 09/28/2024  -     Hemoglobin A1C; Future; Expected date: 09/28/2024  -     Lipid Panel; Future; Expected date: 09/28/2024    6. Abnormal finding of blood chemistry, unspecified  -     CBC Auto Differential; Future; Expected date: 09/28/2024  -     Hemoglobin A1C; Future; Expected date: 09/28/2024  -     Lipid Panel; Future; Expected date: 09/28/2024       Labs for next visit     Follow up in about 6 months (around 9/28/2024) for annual.

## 2024-03-28 NOTE — ASSESSMENT & PLAN NOTE
Last dexa in 2022 showed osteoporosis of femur and lumbar spine. Has been on fosamax since 2020    - continue fosamax for now. Will consider bisphos holiday in 2025

## 2024-04-08 DIAGNOSIS — M81.0 AGE-RELATED OSTEOPOROSIS WITHOUT CURRENT PATHOLOGICAL FRACTURE: Primary | ICD-10-CM

## 2024-04-08 RX ORDER — ACETAMINOPHEN 500 MG
2000 TABLET ORAL DAILY
Qty: 90 CAPSULE | Refills: 3 | Status: SHIPPED | OUTPATIENT
Start: 2024-04-08 | End: 2025-04-08

## 2024-04-09 ENCOUNTER — TELEPHONE (OUTPATIENT)
Dept: FAMILY MEDICINE | Facility: CLINIC | Age: 74
End: 2024-04-09
Payer: MEDICARE

## 2024-04-09 NOTE — TELEPHONE ENCOUNTER
----- Message from Katarzyna López MD sent at 4/8/2024  9:07 AM CDT -----  Please contact the patient and let him/her know that his/her results showed compared with previous DXA, BMD at the lumbar spine has declined, and BMD at the total hip has remained stable. Confirm compliance to weekly alendronate, if compliant she will need to be started on Prolia. Will be starting her on daily vit D. Thank you.

## 2024-04-16 ENCOUNTER — TELEPHONE (OUTPATIENT)
Dept: FAMILY MEDICINE | Facility: CLINIC | Age: 74
End: 2024-04-16
Payer: MEDICARE

## 2024-04-18 ENCOUNTER — TELEPHONE (OUTPATIENT)
Dept: FAMILY MEDICINE | Facility: CLINIC | Age: 74
End: 2024-04-18
Payer: MEDICARE

## 2024-04-18 NOTE — LETTER
April 18, 2024    Christi Coreas  [address] [place] [address]             [facility]  [address] [facility] [place] [address]  Phone: [phone]  Fax: [phone] Dear Ms. Coreas:    Below are the results from your recent visit:    Resulted Orders   DXA Bone Density Axial Skeleton 1 or more sites    Narrative    EXAMINATION:  DXA BONE DENSITY AXIAL SKELETON 1 OR MORE SITES    CLINICAL HISTORY:  Asymptomatic menopausal state.  74 y/o female with no history of fractures.  She had a hysterectomy at 45 y/o.  She is taking Calcium, Vit D and Fosamax.  She does not exercise or smoke.    TECHNIQUE:  DXA specification: [facility] [employer] A ([id number])    Bone Mineral Density scanning was performed over the hip and lumbar spine.    Review of the images confirms satisfactory positioning and technique.    COMPARISON:  Comparison study done on 03/03/2022.    Lumbar spine:     BMD 0.749 g/cm2 and T-score -2.7.    Total Hip:           BMD 0.712 g/cm2 and T-score -1.9.    Distal 1/3 radius: Not applicable    FINDINGS:  Lumbar spine (L1-L4):               BMD is 0.721 g/cm2, T-score is -3.0, and Z-score is -0.5.    Total hip:                                BMD is 0.727 g/cm2, T-score is -1.8, and Z-score is 0.1.    Femoral neck:                          BMD is 0.578 g/cm2, T-score is -2.4, and Z-score is -0.4.    Distal 1/3 radius:                      Not applicable    FRAX:    9% risk of a major osteoporotic fracture in the next 10 years.    2.6% risk of hip fracture in the next 10 years.      Impression    *Osteoporosis based on T-score below -2.5.  *Fracture risk is high.  *Compared with previous DXA, BMD at the lumbar spine has declined by -3.6%, and BMD at the total hip has remained stable.    RECOMMENDATIONS:  *Daily calcium intake 1809-3009 mg, dietary sources preferred; Vitamin D 9986-8666 IU daily.  *Weight bearing exercise and fall precautions.  *Confirm compliance with dosing  and administration of bisphosphonate since patient is losing bone mass still.  Consider alternative therapies.  *Repeat BMD in 2 years.    EXPLANATION OF RESULTS:  T-score compares these results to the average bone density of a 20-29 year-old of the same gender.    Z-score compares this result to the average bone density to people of the same age, gender, and race.    The amounts indicate the number of standard deviations above or below the mean.    * Osteoporosis is generally defined as having a T-score between less than or equal to -2.5.    * Low bone mass (osteopenia) is generally defined as having a T-score between -1.0 and -2.5.    * The normal range is generally defined as having a T-score greater than or equal to -1.0.    * Calculated FRAX scores for fracture risk prediction may not be accurate in the setting of certain clinical factors such as pharmacologic therapy for osteoporosis, prior fragility fractures, high dose glucocorticoid use.      Electronically signed by: Venita Alamo MD  Date:    04/06/2024  Time:    13:04     Attempts to reach you by phone has been unsuccessful. Your results are abnormal.  Please review the attached information and don't hesitate to call our office if you have any questions or concerns.    Your results showed compared with previous DXA, BMD at the lumbar spine has declined, and BMD at the total hip has remained stable. Are you taking alendronate weekly? If compliant you will need to be started on Prolia injection. I sent Vitamin D to be taken daily to your pharmacy.     Sincerely,        Katarzyna López MD    .

## 2024-04-18 NOTE — TELEPHONE ENCOUNTER
Third attempt to contact patient to advise of DEXA scan results. Result letter mailed to patient.

## 2024-08-07 ENCOUNTER — PATIENT OUTREACH (OUTPATIENT)
Dept: ADMINISTRATIVE | Facility: HOSPITAL | Age: 74
End: 2024-08-07
Payer: MEDICARE

## 2024-10-01 RX ORDER — HEPATITIS A AND HEPATITIS B (RECOMBINANT) VACCINE 720; 20 [IU]/ML; UG/ML
INJECTION, SUSPENSION INTRAMUSCULAR
COMMUNITY
Start: 2024-07-03

## 2024-10-01 RX ORDER — RESPIRATORY SYNCYTIAL VISUS VACCINE RECOMBINANT, ADJUVANTED 120MCG/0.5
KIT INTRAMUSCULAR
COMMUNITY
Start: 2024-07-03

## 2024-10-02 ENCOUNTER — TELEPHONE (OUTPATIENT)
Dept: FAMILY MEDICINE | Facility: CLINIC | Age: 74
End: 2024-10-02
Payer: MEDICARE

## 2024-11-07 ENCOUNTER — LAB VISIT (OUTPATIENT)
Dept: LAB | Facility: HOSPITAL | Age: 74
End: 2024-11-07
Attending: INTERNAL MEDICINE
Payer: MEDICARE

## 2024-11-07 ENCOUNTER — OFFICE VISIT (OUTPATIENT)
Dept: FAMILY MEDICINE | Facility: CLINIC | Age: 74
End: 2024-11-07
Payer: MEDICARE

## 2024-11-07 VITALS
BODY MASS INDEX: 25.15 KG/M2 | HEIGHT: 62 IN | DIASTOLIC BLOOD PRESSURE: 64 MMHG | WEIGHT: 136.69 LBS | HEART RATE: 97 BPM | RESPIRATION RATE: 16 BRPM | SYSTOLIC BLOOD PRESSURE: 120 MMHG | TEMPERATURE: 98 F

## 2024-11-07 DIAGNOSIS — I10 ESSENTIAL HYPERTENSION, BENIGN: ICD-10-CM

## 2024-11-07 DIAGNOSIS — R05.9 COUGH, UNSPECIFIED TYPE: ICD-10-CM

## 2024-11-07 DIAGNOSIS — Z12.31 ENCOUNTER FOR SCREENING MAMMOGRAM FOR BREAST CANCER: ICD-10-CM

## 2024-11-07 DIAGNOSIS — R73.02 GLUCOSE INTOLERANCE (IMPAIRED GLUCOSE TOLERANCE): ICD-10-CM

## 2024-11-07 DIAGNOSIS — K21.9 GASTROESOPHAGEAL REFLUX DISEASE, UNSPECIFIED WHETHER ESOPHAGITIS PRESENT: ICD-10-CM

## 2024-11-07 DIAGNOSIS — M81.0 AGE-RELATED OSTEOPOROSIS WITHOUT CURRENT PATHOLOGICAL FRACTURE: ICD-10-CM

## 2024-11-07 DIAGNOSIS — R14.3 FLATULENCE: ICD-10-CM

## 2024-11-07 DIAGNOSIS — R10.13 EPIGASTRIC PAIN: Primary | ICD-10-CM

## 2024-11-07 DIAGNOSIS — R79.9 ABNORMAL FINDING OF BLOOD CHEMISTRY, UNSPECIFIED: ICD-10-CM

## 2024-11-07 DIAGNOSIS — M54.9 BACK PAIN, UNSPECIFIED BACK LOCATION, UNSPECIFIED BACK PAIN LATERALITY, UNSPECIFIED CHRONICITY: ICD-10-CM

## 2024-11-07 DIAGNOSIS — R11.0 NAUSEA: ICD-10-CM

## 2024-11-07 DIAGNOSIS — R10.13 EPIGASTRIC PAIN: ICD-10-CM

## 2024-11-07 DIAGNOSIS — Z00.00 ANNUAL PHYSICAL EXAM: ICD-10-CM

## 2024-11-07 LAB
ALBUMIN SERPL BCP-MCNC: 4.1 G/DL (ref 3.5–5.2)
ALBUMIN SERPL BCP-MCNC: 4.1 G/DL (ref 3.5–5.2)
ALP SERPL-CCNC: 82 U/L (ref 40–150)
ALP SERPL-CCNC: 82 U/L (ref 40–150)
ALT SERPL W/O P-5'-P-CCNC: 13 U/L (ref 10–44)
ALT SERPL W/O P-5'-P-CCNC: 13 U/L (ref 10–44)
AMYLASE SERPL-CCNC: 45 U/L (ref 20–110)
ANION GAP SERPL CALC-SCNC: 8 MMOL/L (ref 8–16)
ANION GAP SERPL CALC-SCNC: 8 MMOL/L (ref 8–16)
AST SERPL-CCNC: 25 U/L (ref 10–40)
AST SERPL-CCNC: 25 U/L (ref 10–40)
BASOPHILS # BLD AUTO: 0.08 K/UL (ref 0–0.2)
BASOPHILS NFR BLD: 1.3 % (ref 0–1.9)
BILIRUB SERPL-MCNC: 0.3 MG/DL (ref 0.1–1)
BILIRUB SERPL-MCNC: 0.3 MG/DL (ref 0.1–1)
BUN SERPL-MCNC: 11 MG/DL (ref 8–23)
BUN SERPL-MCNC: 11 MG/DL (ref 8–23)
CALCIUM SERPL-MCNC: 9.3 MG/DL (ref 8.7–10.5)
CALCIUM SERPL-MCNC: 9.3 MG/DL (ref 8.7–10.5)
CHLORIDE SERPL-SCNC: 105 MMOL/L (ref 95–110)
CHLORIDE SERPL-SCNC: 105 MMOL/L (ref 95–110)
CHOLEST SERPL-MCNC: 229 MG/DL (ref 120–199)
CHOLEST/HDLC SERPL: 3.9 {RATIO} (ref 2–5)
CO2 SERPL-SCNC: 26 MMOL/L (ref 23–29)
CO2 SERPL-SCNC: 26 MMOL/L (ref 23–29)
CREAT SERPL-MCNC: 0.8 MG/DL (ref 0.5–1.4)
CREAT SERPL-MCNC: 0.8 MG/DL (ref 0.5–1.4)
DIFFERENTIAL METHOD BLD: ABNORMAL
EOSINOPHIL # BLD AUTO: 0.2 K/UL (ref 0–0.5)
EOSINOPHIL NFR BLD: 3.7 % (ref 0–8)
ERYTHROCYTE [DISTWIDTH] IN BLOOD BY AUTOMATED COUNT: 12.5 % (ref 11.5–14.5)
EST. GFR  (NO RACE VARIABLE): >60 ML/MIN/1.73 M^2
EST. GFR  (NO RACE VARIABLE): >60 ML/MIN/1.73 M^2
ESTIMATED AVG GLUCOSE: 131 MG/DL (ref 68–131)
ESTIMATED AVG GLUCOSE: 131 MG/DL (ref 68–131)
GLUCOSE SERPL-MCNC: 116 MG/DL (ref 70–110)
GLUCOSE SERPL-MCNC: 116 MG/DL (ref 70–110)
HBA1C MFR BLD: 6.2 % (ref 4–5.6)
HBA1C MFR BLD: 6.2 % (ref 4–5.6)
HCT VFR BLD AUTO: 38.6 % (ref 37–48.5)
HDLC SERPL-MCNC: 59 MG/DL (ref 40–75)
HDLC SERPL: 25.8 % (ref 20–50)
HGB BLD-MCNC: 12.2 G/DL (ref 12–16)
IMM GRANULOCYTES # BLD AUTO: 0.02 K/UL (ref 0–0.04)
IMM GRANULOCYTES NFR BLD AUTO: 0.3 % (ref 0–0.5)
LDLC SERPL CALC-MCNC: 144.8 MG/DL (ref 63–159)
LIPASE SERPL-CCNC: 42 U/L (ref 4–60)
LYMPHOCYTES # BLD AUTO: 1.8 K/UL (ref 1–4.8)
LYMPHOCYTES NFR BLD: 29.3 % (ref 18–48)
MCH RBC QN AUTO: 29 PG (ref 27–31)
MCHC RBC AUTO-ENTMCNC: 31.6 G/DL (ref 32–36)
MCV RBC AUTO: 92 FL (ref 82–98)
MONOCYTES # BLD AUTO: 0.6 K/UL (ref 0.3–1)
MONOCYTES NFR BLD: 9.1 % (ref 4–15)
NEUTROPHILS # BLD AUTO: 3.5 K/UL (ref 1.8–7.7)
NEUTROPHILS NFR BLD: 56.3 % (ref 38–73)
NONHDLC SERPL-MCNC: 170 MG/DL
NRBC BLD-RTO: 0 /100 WBC
PLATELET # BLD AUTO: 259 K/UL (ref 150–450)
PMV BLD AUTO: 10.9 FL (ref 9.2–12.9)
POTASSIUM SERPL-SCNC: 3.4 MMOL/L (ref 3.5–5.1)
POTASSIUM SERPL-SCNC: 3.4 MMOL/L (ref 3.5–5.1)
PROT SERPL-MCNC: 7.3 G/DL (ref 6–8.4)
PROT SERPL-MCNC: 7.3 G/DL (ref 6–8.4)
RBC # BLD AUTO: 4.2 M/UL (ref 4–5.4)
SODIUM SERPL-SCNC: 139 MMOL/L (ref 136–145)
SODIUM SERPL-SCNC: 139 MMOL/L (ref 136–145)
TRIGL SERPL-MCNC: 126 MG/DL (ref 30–150)
WBC # BLD AUTO: 6.15 K/UL (ref 3.9–12.7)

## 2024-11-07 PROCEDURE — 83036 HEMOGLOBIN GLYCOSYLATED A1C: CPT | Mod: 91 | Performed by: INTERNAL MEDICINE

## 2024-11-07 PROCEDURE — 83690 ASSAY OF LIPASE: CPT

## 2024-11-07 PROCEDURE — 83036 HEMOGLOBIN GLYCOSYLATED A1C: CPT

## 2024-11-07 PROCEDURE — 85025 COMPLETE CBC W/AUTO DIFF WBC: CPT | Performed by: INTERNAL MEDICINE

## 2024-11-07 PROCEDURE — 82652 VIT D 1 25-DIHYDROXY: CPT | Performed by: INTERNAL MEDICINE

## 2024-11-07 PROCEDURE — 82150 ASSAY OF AMYLASE: CPT

## 2024-11-07 PROCEDURE — 86677 HELICOBACTER PYLORI ANTIBODY: CPT

## 2024-11-07 PROCEDURE — 99999 PR PBB SHADOW E&M-EST. PATIENT-LVL V: CPT | Mod: PBBFAC,,,

## 2024-11-07 PROCEDURE — 36415 COLL VENOUS BLD VENIPUNCTURE: CPT | Mod: PO | Performed by: INTERNAL MEDICINE

## 2024-11-07 PROCEDURE — 80053 COMPREHEN METABOLIC PANEL: CPT | Performed by: INTERNAL MEDICINE

## 2024-11-07 PROCEDURE — 80061 LIPID PANEL: CPT | Performed by: INTERNAL MEDICINE

## 2024-11-07 NOTE — PROGRESS NOTES
Health Maintenance Due   Topic     Colorectal Cancer Screening  CONSULT WITH PCP    Hemoglobin A1c (Prediabetes)

## 2024-11-07 NOTE — PROGRESS NOTES
"  HPI     Chief Complaint:  Chief Complaint   Patient presents with    Shortness of Breath    Back Pain    Cough     Two weeks       Christi Coreas is a 73 y.o. female with multiple medical diagnoses as listed in the medical history and problem list that presents for SOB, cough and cough    HPI    History of Present Illness    CHIEF COMPLAINT:  Christi presents today for cough and shortness of breath.    RESPIRATORY SYMPTOMS:  She reports a persistent productive cough with mucus for the past two weeks, particularly when eating. She also complains of shortness of breath. She has a history of bronchitis and suggests current symptoms may be related to a recent cold. She notes some improvement in breathing after using an unspecified treatment, which she attributes to possible asthma, but the cough persists.    GASTROINTESTINAL SYMPTOMS:  She reports feeling full after eating small amounts of food, frequent burping (a new symptom), occasional nausea, and abdominal discomfort associated with indigestion. She denies changes in stool color or consistency, reporting regular daily bowel movements. She also denies blood in stool or vomiting.    MUSCULOSKELETAL PAIN:  She reports hip pain radiating down her leg, causing difficulty walking after driving. She sometimes cannot lift her foot and has to shuffle due to the pain. She also mentions lower back pain, describing it as if someone was "staring" her muscles. The pain affects her sleep, prompting her to wear compression socks for relief.    MEDICATIONS:  She takes stomach medicine which seems to help, Fosamax and Vitamin D for bone health, and Gabapentin (described as a yellow pill) for bone pain. She has been taking Fosamax for years. She denies taking Prolia.    MEDICAL HISTORY:  She had an endoscopy in 2015 and a bone density scan in March of the current year.    LIFESTYLE AND PHYSICAL ACTIVITY:  She reports limited physical activity due to prolonged sitting at the computer. " She performs daily stretches to alleviate pain and pressure, particularly in her legs, which provide some relief. She acknowledges the need to build endurance through walking to improve balance and further stretch leg muscles. She expresses concern about age-related wear and tear, noting these symptoms are new to her.              4/8/2024  Dr. López  Please contact the patient and let him/her know that his/her results showed compared with previous DXA, BMD at the lumbar spine has declined, and BMD at the total hip has remained stable. Confirm compliance to weekly alendronate, if compliant she will need to be started on Prolia. Will be starting her on daily vit D. Thank you.       Assessment & Plan     Suspected possible H. pylori contributing to patient's persistent cough, shortness of breath, and GI symptoms  Considered potential exacerbation of existing bronchitis or asthma  Assessed for potential hiatal hernia, given patient's symptoms of burping and feeling of fullness after eating  Evaluated lower back and hip pain, considering possible sciatica  Reviewed recent bone density scan results, noting recommendation for Prolia    FOLLOW UP:  - Follow up after receiving results from EGD, colonoscopy, and lab work.         Problem List Items Addressed This Visit       Essential hypertension, benign   Blood pressure is in normal range and stable today  Continue current medication regimen,   amlodipine and benazepril   Advised to continue with low-sodium diet, regular cardiovascular exercises, portion control      Glucose intolerance (impaired glucose tolerance)    Relevant Orders    HEMOGLOBIN A1C    GERD (gastroesophageal reflux disease)    Relevant Orders    Ambulatory referral/consult to Endo Procedure     H. PYLORI ANTIBODY, IGG    Age-related osteoporosis without current pathological fracture    Relevant Medications    denosumab (PROLIA) 60 mg/mL Syrg    OSTEOPOROSIS:  - Continued Fosamax and vitamin D for  bone health.    Cough    Relevant Orders    Ambulatory referral/consult to Endo Procedure     H. PYLORI ANTIBODY, IGG    Back pain    CHRONIC PAIN:  - Continued Gabapentin for pain management.  HIP AND BACK PAIN:  - Discussed importance of stretches and gentle exercise for managing hip and back pain.  - Explained potential benefits of using pedal exerciser for building endurance and improving mobility.  - Leres to continue performing daily stretches, focusing on hip area.  - Leres to consider using son's pedal exerciser when not in use.  - Recommend gradually increasing walking, starting with short distances and building up endurance.     Other Visit Diagnoses       Nausea        Relevant Orders    Ambulatory referral/consult to Endo Procedure     Encounter for screening mammogram for breast cancer        Relevant Orders    Mammo Digital Screening Bilat w/ Ld    BREAST CANCER SCREENING:  - Ordered mammogram.    Flatulence        Relevant Orders    H. PYLORI ANTIBODY, IGG    Epigastric pain  - Primary    Relevant Orders    Comprehensive Metabolic Panel    Lipase    AMYLASE  GASTROINTESTINAL EVALUATION:  - Ordered EGD (upper endoscopy).  - Ordered colonoscopy.  - Ordered lab work including H.  - pylori test, electrolytes, kidney function, liver function, and pancreatic enzymes.  - Contact the office to schedule procedures (EGD and colonoscopy) at [facility] facility in [place] for patient convenience.              --------------------------------------------      Health Maintenance:  Health Maintenance         Date Due Completion Date    Colorectal Cancer Screening Never done ---    Hemoglobin A1c (Prediabetes) 10/25/2023 10/25/2022    DEXA Scan 03/28/2026 3/28/2024    TETANUS VACCINE 09/09/2026 9/9/2016    Override on 9/9/2016: Done (fax from [facility])    Lipid Panel 10/25/2027 10/25/2022            Health maintenance reviewed, Colon cancer screening ordered, Mammogram ordered, and A1c  "ordered    Follow Up:  Follow up if symptoms worsen or fail to improve.    Exam     Review of Systems:  (as noted above)  Review of Systems  General: -fever, -chills, -fatigue, -weight gain, -weight loss  Eyes: -vision changes, -redness, -discharge  ENT: -ear pain, -nasal congestion, -sore throat  Cardiovascular: -chest pain, -palpitations, -lower extremity edema  Respiratory: +cough, +shortness of breath, +sputum production  Gastrointestinal: -abdominal pain, +nausea, -vomiting, -diarrhea, -constipation, -blood in stool, -change in bowel habits  Genitourinary: -dysuria, -hematuria, -frequency  Musculoskeletal: +joint pain, -muscle pain  Skin: -rash, -lesion  Neurological: -headache, -dizziness, -numbness, -tingling  Psychiatric: -anxiety, -depression, -sleep difficulty     Physical Exam:   Physical Exam  Vitals:    11/07/24 1313   BP: 120/64   BP Location: Left arm   Patient Position: Sitting   Pulse: 97   Resp: 16   Temp: 98.4 °F (36.9 °C)   TempSrc: Oral   Weight: 62 kg (136 lb 11 oz)   Height: 5' 2" (1.575 m)      Body mass index is 25 kg/m².    Physical Exam    General: No acute distress. Well-developed. Well-nourished.  Eyes: EOMI. Sclerae anicteric.  HENT: Normocephalic. Atraumatic. Nares patent.   Cardiovascular: Regular rate. Regular rhythm. No murmurs. No rubs. No gallops. Normal S1, S2.  Respiratory: Normal respiratory effort. Clear to auscultation bilaterally. No rales. No rhonchi. No wheezing.  Abdomen: Soft. Tenderness in upper gastric area bilaterally. Non-distended. Normoactive bowel sounds.  Musculoskeletal: No  obvious deformity.  Extremities: No lower extremity edema.  Neurological: Alert & oriented x3. No slurred speech. Normal gait.  Psychiatric: Normal mood. Normal affect. Good insight. Good judgment.  Skin: Warm. Dry. No rash.           History     Past Medical History:  Past Medical History:   Diagnosis Date    Degenerative disc disease     lumbar    Glucose intolerance (impaired glucose " "tolerance)     Hypertension     LUQ abdominal pain 04/16/2015    Non-recurrent acute serous otitis media of right ear 11/29/2023       Past Surgical History:  Past Surgical History:   Procedure Laterality Date    APPENDECTOMY      HYSTERECTOMY      OVARIAN CYST SURGERY         Social History:  Social History     Socioeconomic History    Marital status:    Tobacco Use    Smoking status: Never    Smokeless tobacco: Never   Substance and Sexual Activity    Alcohol use: Yes     Comment: occacionally wine    Drug use: No       Family History:  Family History   Problem Relation Name Age of Onset    Cancer Mother      Hypertension Father      Cancer Sister      Diabetes Sister      Breast cancer Sister      Breast cancer Maternal Aunt         Allergies and Medications: (updated and reviewed)  Review of patient's allergies indicates:   Allergen Reactions    Aspirin     Nexium [esomeprazole magnesium] Other (See Comments)     "bad pain"    Indomethacin Rash     Current Outpatient Medications   Medication Sig Dispense Refill    albuterol (VENTOLIN HFA) 90 mcg/actuation inhaler Inhale 2 puffs into the lungs every 6 (six) hours as needed for Wheezing. Rescue 18 g 5    alendronate (FOSAMAX) 70 MG tablet TAKE ONE TABLET BY MOUTH EVERY 7 DAYS 12 tablet 0    amLODIPine (NORVASC) 5 MG tablet Take 1 tablet (5 mg total) by mouth once daily. 90 tablet 3    AREXVY, PF, 120 mcg/0.5 mL SusR vaccine       atorvastatin (LIPITOR) 40 MG tablet Take 1 tablet (40 mg total) by mouth every evening. 90 tablet 3    benazepriL (LOTENSIN) 40 MG tablet Take 1 tablet (40 mg total) by mouth once daily. 90 tablet 3    cholecalciferol, vitamin D3, (VITAMIN D3) 50 mcg (2,000 unit) Cap capsule Take 1 capsule (2,000 Units total) by mouth once daily. 90 capsule 3    citalopram (CELEXA) 40 MG tablet Take 1 tablet by mouth once daily 90 tablet 0    gabapentin (NEURONTIN) 300 MG capsule Take 1 capsule (300 mg total) by mouth every evening. 90 capsule 3    " levocetirizine (XYZAL) 5 MG tablet Take 1 tablet (5 mg total) by mouth every evening. 90 tablet 3    omeprazole (PRILOSEC) 40 MG capsule Take 1 capsule (40 mg total) by mouth once daily. 90 capsule 3    TWINRIX, PF, 720 LIMA unit- 20 mcg/mL Syrg suspension       denosumab (PROLIA) 60 mg/mL Syrg Inject 1 mL (60 mg total) into the skin every 6 (six) months. 2 mL 1     No current facility-administered medications for this visit.       Patient Care Team:  Katarzyna López MD as PCP - General (Internal Medicine)         - The patient is given an After Visit Summary that lists all medications with directions, allergies, education, orders placed during this encounter and follow-up instructions.      - I have reviewed the patient's medical information including past medical, family, and social history sections including the medications and allergies.      - We discussed the patient's current medications.     This note was created by combination of typed  and MModal dictation.  Transcription errors may be present.  If there are any questions, please contact me.     This note was generated with the assistance of ambient listening technology. Verbal consent was obtained by the patient and accompanying visitor(s) for the recording of patient appointment to facilitate this note. I attest to having reviewed and edited the generated note for accuracy, though some syntax or spelling errors may persist. Please contact the author of this note for any clarification.          Ping Velazco PA-C

## 2024-11-08 LAB — H PYLORI IGG SERPL QL IA: NEGATIVE

## 2024-11-11 LAB — 1,25(OH)2D3 SERPL-MCNC: 73 PG/ML (ref 20–79)

## 2024-11-12 ENCOUNTER — TELEPHONE (OUTPATIENT)
Dept: FAMILY MEDICINE | Facility: CLINIC | Age: 74
End: 2024-11-12
Payer: MEDICARE

## 2024-11-12 NOTE — TELEPHONE ENCOUNTER
Called patient to inform them of lab results, patient answered and understood      ----- Message from Katarzyna López MD sent at 11/11/2024  9:44 PM CST -----  Please contact the patient and let him/her know that his/her results were stable. Cholesterol improving but still slightly high. Thank you.

## 2024-11-25 ENCOUNTER — TELEPHONE (OUTPATIENT)
Dept: PHARMACY | Facility: CLINIC | Age: 74
End: 2024-11-25
Payer: MEDICARE

## 2024-11-25 ENCOUNTER — TELEPHONE (OUTPATIENT)
Dept: FAMILY MEDICINE | Facility: CLINIC | Age: 74
End: 2024-11-25
Payer: MEDICARE

## 2024-11-25 DIAGNOSIS — M81.0 AGE-RELATED OSTEOPOROSIS WITHOUT CURRENT PATHOLOGICAL FRACTURE: Primary | ICD-10-CM

## 2024-11-25 NOTE — LETTER
November 25, 2024    Christi Coreas  [address] [place] [address]               Dear Ms. Coreas,      My name is Sea Mendez   I am reaching out on behalf of [facility] Patient Assistance Team after receiving a referral from your Provider inquiring about assistance with your medication. Our goal is to assist qualified patients with financial assistance for their medications to better help enrollment for their medications to better help you achieve your health goals.      Please note that enrollment into available support may require the following documents:      Proof of household Income (such as social security statement, 1099 form, pension statement or 3 consecutive pay stubs)  Copy of all insurance cards (front and back)  Print out from your insurance or pharmacy showing how much you have spent on prescriptions this year  Completed Medication Access Center Authorization Forms       Please reach out to my phone number below if you are still in need of assistance with your medications. Follow-up call will be made in 5 business days. We look forward to hearing from you soon!    Thank you for choosing [facility] for your healthcare needs      Sincerely  Sea CUNNINGHAM @[phone]  Pharmacy Patient Assistance Team  [address] [place] [address]  [id number]  [place] [address]  Fax: [phone]  Email: [email or web address]

## 2024-11-25 NOTE — TELEPHONE ENCOUNTER
Prior authorization was placed for pts prolia and nurse visit scheduled to begin process; manish also placed order for pt assistance program

## 2024-11-25 NOTE — TELEPHONE ENCOUNTER
We have reached out to Ms. Coreas via letter to inform her of the [employer] application process for Prolia.   A follow-up phone call will be made in 5 business days.    Sea Mendez   Pharmacy Patient Assistance Team

## 2024-12-02 ENCOUNTER — TELEPHONE (OUTPATIENT)
Dept: FAMILY MEDICINE | Facility: CLINIC | Age: 74
End: 2024-12-02
Payer: MEDICARE

## 2024-12-02 NOTE — TELEPHONE ENCOUNTER
LM informing pt her prolia has been approved through her medical benefits with copay of $35; I have her scheduled for 12/9 at 10am to receive the injection; please call office to verify if she is ok with this

## 2024-12-09 ENCOUNTER — CLINICAL SUPPORT (OUTPATIENT)
Dept: FAMILY MEDICINE | Facility: CLINIC | Age: 74
End: 2024-12-09
Payer: MEDICARE

## 2024-12-09 DIAGNOSIS — M81.0 AGE-RELATED OSTEOPOROSIS WITHOUT CURRENT PATHOLOGICAL FRACTURE: Primary | ICD-10-CM

## 2024-12-09 PROCEDURE — 96372 THER/PROPH/DIAG INJ SC/IM: CPT | Mod: S$GLB,,,

## 2024-12-09 NOTE — PROGRESS NOTES
Administered Prolia 60 mg SC to left arm.  Patient tolerated injection well, no adverse reactions noted. Patient advised to return in 6 months for next injection.  Patient verbalized understanding.

## 2025-01-03 ENCOUNTER — HOSPITAL ENCOUNTER (EMERGENCY)
Facility: HOSPITAL | Age: 75
Discharge: HOME | End: 2025-01-03
Attending: STUDENT IN AN ORGANIZED HEALTH CARE EDUCATION/TRAINING PROGRAM
Payer: MEDICARE

## 2025-01-03 ENCOUNTER — PATIENT OUTREACH (OUTPATIENT)
Dept: ADMINISTRATIVE | Facility: OTHER | Age: 75
End: 2025-01-03
Payer: MEDICARE

## 2025-01-03 VITALS
HEIGHT: 62 IN | BODY MASS INDEX: 24.66 KG/M2 | OXYGEN SATURATION: 96 % | WEIGHT: 134 LBS | DIASTOLIC BLOOD PRESSURE: 79 MMHG | HEART RATE: 84 BPM | RESPIRATION RATE: 18 BRPM | TEMPERATURE: 99 F | SYSTOLIC BLOOD PRESSURE: 145 MMHG

## 2025-01-03 DIAGNOSIS — I10 ESSENTIAL HYPERTENSION: ICD-10-CM

## 2025-01-03 DIAGNOSIS — R07.81 RIB PAIN: ICD-10-CM
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
## 2025-01-03 LAB
ALBUMIN SERPL BCP-MCNC: 4.4 G/DL (ref 3.5–5.2)
ALP SERPL-CCNC: 89 U/L (ref 40–150)
ALT SERPL W/O P-5'-P-CCNC: 16 U/L (ref 10–44)
ANION GAP SERPL CALC-SCNC: 8 MMOL/L (ref 8–16)
AST SERPL-CCNC: 27 U/L (ref 10–40)
BASOPHILS # BLD AUTO: 0.07 K/UL (ref 0–0.2)
BASOPHILS NFR BLD: 1.1 % (ref 0–1.9)
BILIRUB SERPL-MCNC: 0.2 MG/DL (ref 0.1–1)
BILIRUB UR QL STRIP: NEGATIVE
BUN SERPL-MCNC: 17 MG/DL (ref 8–23)
CALCIUM SERPL-MCNC: 10.1 MG/DL (ref 8.7–10.5)
CHLORIDE SERPL-SCNC: 106 MMOL/L (ref 95–110)
CLARITY UR: CLEAR
CO2 SERPL-SCNC: 25 MMOL/L (ref 23–29)
COLOR UR: YELLOW
CREAT SERPL-MCNC: 0.9 MG/DL (ref 0.5–1.4)
DIFFERENTIAL METHOD BLD: ABNORMAL
EOSINOPHIL # BLD AUTO: 0.2 K/UL (ref 0–0.5)
EOSINOPHIL NFR BLD: 2.4 % (ref 0–8)
ERYTHROCYTE [DISTWIDTH] IN BLOOD BY AUTOMATED COUNT: 12.3 % (ref 11.5–14.5)
EST. GFR  (NO RACE VARIABLE): >60 ML/MIN/1.73 M^2
GLUCOSE SERPL-MCNC: 107 MG/DL (ref 70–110)
GLUCOSE UR QL STRIP: NEGATIVE
HCT VFR BLD AUTO: 39.4 % (ref 37–48.5)
HGB BLD-MCNC: 13.2 G/DL (ref 12–16)
HGB UR QL STRIP: ABNORMAL
IMM GRANULOCYTES # BLD AUTO: 0.04 K/UL (ref 0–0.04)
IMM GRANULOCYTES NFR BLD AUTO: 0.6 % (ref 0–0.5)
KETONES UR QL STRIP: NEGATIVE
LEUKOCYTE ESTERASE UR QL STRIP: NEGATIVE
LYMPHOCYTES # BLD AUTO: 1.3 K/UL (ref 1–4.8)
LYMPHOCYTES NFR BLD: 19.9 % (ref 18–48)
MCH RBC QN AUTO: 30.4 PG (ref 27–31)
MCHC RBC AUTO-ENTMCNC: 33.5 G/DL (ref 32–36)
MCV RBC AUTO: 91 FL (ref 82–98)
MONOCYTES # BLD AUTO: 0.7 K/UL (ref 0.3–1)
MONOCYTES NFR BLD: 10.7 % (ref 4–15)
NEUTROPHILS # BLD AUTO: 4.1 K/UL (ref 1.8–7.7)
NEUTROPHILS NFR BLD: 65.3 % (ref 38–73)
NITRITE UR QL STRIP: NEGATIVE
NRBC BLD-RTO: 0 /100 WBC
PH UR STRIP: 5 [PH] (ref 5–8)
PLATELET # BLD AUTO: 227 K/UL (ref 150–450)
PMV BLD AUTO: 10.5 FL (ref 9.2–12.9)
POTASSIUM SERPL-SCNC: 3.9 MMOL/L (ref 3.5–5.1)
PROT SERPL-MCNC: 8.1 G/DL (ref 6–8.4)
PROT UR QL STRIP: NEGATIVE
RBC # BLD AUTO: 4.34 M/UL (ref 4–5.4)
SODIUM SERPL-SCNC: 139 MMOL/L (ref 136–145)
SP GR UR STRIP: 1.01 (ref 1–1.03)
URN SPEC COLLECT METH UR: ABNORMAL
UROBILINOGEN UR STRIP-ACNC: NEGATIVE EU/DL
WBC # BLD AUTO: 6.29 K/UL (ref 3.9–12.7)

## 2025-01-03 PROCEDURE — 25500020 PHARM REV CODE 255

## 2025-01-03 PROCEDURE — 81003 URINALYSIS AUTO W/O SCOPE: CPT

## 2025-01-03 PROCEDURE — 96374 THER/PROPH/DIAG INJ IV PUSH: CPT

## 2025-01-03 PROCEDURE — 80053 COMPREHEN METABOLIC PANEL: CPT

## 2025-01-03 PROCEDURE — 63600175 PHARM REV CODE 636 W HCPCS

## 2025-01-03 PROCEDURE — 99285 EMERGENCY DEPT VISIT HI MDM: CPT | Mod: 25

## 2025-01-03 PROCEDURE — 85025 COMPLETE CBC W/AUTO DIFF WBC: CPT

## 2025-01-03 RX ORDER — MORPHINE SULFATE 4 MG/ML
2 INJECTION, SOLUTION INTRAMUSCULAR; INTRAVENOUS
Status: COMPLETED | OUTPATIENT
Start: 2025-01-03 | End: 2025-01-03

## 2025-01-03 RX ORDER — BENAZEPRIL HYDROCHLORIDE 40 MG/1
40 TABLET ORAL DAILY
Qty: 90 TABLET | Refills: 3 | Status: SHIPPED | OUTPATIENT
Start: 2025-01-03

## 2025-01-03 RX ADMIN — IOHEXOL 80 ML: 350 INJECTION, SOLUTION INTRAVENOUS at 01:01

## 2025-01-03 RX ADMIN — MORPHINE SULFATE 2 MG: 4 INJECTION, SOLUTION INTRAMUSCULAR; INTRAVENOUS at 12:01

## 2025-01-03 NOTE — ED TRIAGE NOTES
Pt reporting 3/10 left sided rib/back pain that worsens with movement, s/p falling off her sofa yesterday. Pt denies hitting her head, LOC, or use of blood thinning medications. Pt denies any other symptoms. Pmhx of HTN, and DDD.

## 2025-01-03 NOTE — TELEPHONE ENCOUNTER
Care Due:                  Date            Visit Type   Department     Provider  --------------------------------------------------------------------------------                                EP -                              PRIMARY      [facility]  Last Visit: 03-      CARE ([facility])   MEDICINE       Katarzyna López  Next Visit: None Scheduled  None         None Found                                                            Last  Test          Frequency    Reason                     Performed    Due Date  --------------------------------------------------------------------------------    Office Visit  12 months..  alendronate,               03- 03-                             cholecalciferol,.........    Mg Level....  12 months..  alendronate..............  Not Found    Overdue    Phosphate...  12 months..  alendronate..............  Not Found    Overdue    [employer] Embedded Care Due Messages. Reference number: [id number].   1/03/2025 11:12:41 AM CST

## 2025-01-03 NOTE — PROGRESS NOTES
CHW - Initial Contact    This Community Health Worker completed the Social Determinant of Health questionnaire with patient  at bedside  today.    Pt identified barriers of most importance are: has issues with utility bill payments.   Referrals to community agencies completed with patient/caregiver consent outside of [facility] include: yes: [email or web address].  Referrals were put through [facility] - yes: [facility].  Support and Services: has no support at this time.  Other information discussed the patient needs / wants help with: Completed SDOH with patient at bedside today, Pt has issues with utility bill payments. Will follow up in one week to check status of referrals and patient's future needs.    Follow up required: yes.  Follow-up Outreach - Due: 1/9/2025

## 2025-01-03 NOTE — ED PROVIDER NOTES
"Encounter Date: 1/3/2025       History     Chief Complaint   Patient presents with    Fall     Pt reports she fell of of her couch yesterday while sleeping. She landed on a tile floor on her left side and has been experiencing pain to the left side of her chest around to her back. Denies LoC, denies hitting her head. Denies blood thinners. Pt reports tenderness under the left breast.     74-year-old female with past medical history of hypertension and osteoporosis now presents with a chief complaint of left-sided rib pain following a fall.  Patient reports that she was lying on her couch and fell off landing on the left side of her ribs and left elbow.  Patient did not hit her head or neck and denies any headaches neck pains, new numbness, or weakness.  Patient however does tell me that she is experiencing severe pain on the left side of her chest wall and hurts when she takes a deep breath.  Additionally patient reports abdominal pain underlying her affected ribs.    The history is provided by the patient.     Review of patient's allergies indicates:   Allergen Reactions    Aspirin     Nexium [esomeprazole magnesium] Other (See Comments)     "bad pain"    Indomethacin Rash     Past Medical History:   Diagnosis Date    Degenerative disc disease     lumbar    Glucose intolerance (impaired glucose tolerance)     Hypertension     LUQ abdominal pain 04/16/2015    Non-recurrent acute serous otitis media of right ear 11/29/2023     Past Surgical History:   Procedure Laterality Date    APPENDECTOMY      HYSTERECTOMY      OVARIAN CYST SURGERY       Family History   Problem Relation Name Age of Onset    Cancer Mother      Hypertension Father      Cancer Sister      Diabetes Sister      Breast cancer Sister      Breast cancer Maternal Aunt       Social History     Tobacco Use    Smoking status: Never    Smokeless tobacco: Never   Substance Use Topics    Alcohol use: Yes     Comment: occacionally wine    Drug use: No "     Review of Systems  See HPI     Physical Exam     Initial Vitals [01/03/25 1036]   BP Pulse Resp Temp SpO2   (!) 153/67 87 18 98.5 °F (36.9 °C) 97 %      MAP       --         Physical Exam    Nursing note and vitals reviewed.      Gen: AxOx3, well nourished, appears stated age, no pallor, no jaundice, appears well hydrated  Eye: EOMI, no scleral icterus, no periorbital edema or ecchymosis  Head: Normocephalic, atraumatic, no lesions, scalp appears normal  ENT: Neck supple, no stridor, no masses, no drooling or voice changes  CVS: All distal pulses intact with normal rate and rhythm, no JVD, normal S1/S2, no murmur  Pulm:  Tenderness to palpation over left lower ribcage  Normal breath sounds, no wheezes, rales or rhonchi, no increased work of breathing  Abd:  Nondistended, soft, tender to palpation over left upper quadrant with guarding but no rebound tenderness, no organomegaly, no CVAT  Ext:  Abrasion over posterior aspect of left elbow with minimal tenderness and no crepitus.  Normal range of motion of elbow.  Pulse and sensation intact.  Neuro: GCS15, moving all extremities, gait intact, face grossly symmetric  Psych: normal affect, cooperative, well groomed, makes good eye contact      ED Course   Procedures  Labs Reviewed   CBC W/ AUTO DIFFERENTIAL - Abnormal       Result Value    WBC 6.29      RBC 4.34      Hemoglobin 13.2      Hematocrit 39.4      MCV 91      MCH 30.4      MCHC 33.5      RDW 12.3      Platelets 227      MPV 10.5      Immature Granulocytes 0.6 (*)     Gran # (ANC) 4.1      Immature Grans (Abs) 0.04      Lymph # 1.3      Mono # 0.7      Eos # 0.2      Baso # 0.07      nRBC 0      Gran % 65.3      Lymph % 19.9      Mono % 10.7      Eosinophil % 2.4      Basophil % 1.1      Differential Method Automated     URINALYSIS, REFLEX TO URINE CULTURE - Abnormal    Specimen UA Urine, Clean Catch      Color, UA Yellow      Appearance, UA Clear      pH, UA 5.0      Specific Gravity, UA 1.015       Protein, UA Negative      Glucose, UA Negative      Ketones, UA Negative      Bilirubin (UA) Negative      Occult Blood UA Trace (*)     Nitrite, UA Negative      Urobilinogen, UA Negative      Leukocytes, UA Negative      Narrative:     Specimen Source->Urine   COMPREHENSIVE METABOLIC PANEL    Sodium 139      Potassium 3.9      Chloride 106      CO2 25      Glucose 107      BUN 17      Creatinine 0.9      Calcium 10.1      Total Protein 8.1      Albumin 4.4      Total Bilirubin 0.2      Alkaline Phosphatase 89      AST 27      ALT 16      eGFR >60      Anion Gap 8            Imaging Results               CT Chest Abdomen Pelvis With IV Contrast (XPD) Routine Oral Contrast (Final result)  Result time 01/03/25 14:28:05      Final result by Home Gao MD (01/03/25 14:28:05)                   Impression:      This report was flagged in Epic as abnormal.    1. No findings to suggest acute solid organ injury of the chest, abdomen, or pelvis.  2. Findings suggest hepatic steatosis, correlation with LFTs recommended.  3. Colonic diverticulosis without diverticulitis.  4. Small cluster of tree-in-bud type nodules within the periphery of the left lower lobe, developing infectious or inflammatory process is a consideration.  Correlation is advised, short-term follow-up recommended to ensure resolution and to exclude discrete pulmonary nodule.  5. Please see above for several additional findings.      Electronically signed by: Home Gao MD  Date:    01/03/2025  Time:    14:28               Narrative:    EXAMINATION:  CT CHEST ABDOMEN PELVIS WITH IV CONTRAST (XPD)    CLINICAL HISTORY:  Polytrauma, blunt;    TECHNIQUE:  Low dose axial images, sagittal and coronal reformations were obtained from the thoracic inlet to the pubic symphysis following the IV administration of 80 mL of Omnipaque 350 .  No oral contrast was administered.    COMPARISON:  None    FINDINGS:  The structures the base of the neck are grossly  unremarkable noting motion artifact.  No significant mediastinal lymphadenopathy.  The heart is not enlarged.  No pericardial effusion.  The thoracic aorta tapers normally noting atherosclerotic calcification along its course and in the distribution of the coronary arteries.    Motion artifact limits evaluation of the airways and pulmonary parenchyma.  Allowing for this, the airways are patent.  There is biapical atelectasis or scarring.  There is bilateral basilar dependent atelectasis.  No pneumothorax.  No pleural effusion.  There is a calcified granuloma within the left lower lobe.  There are is a cluster of tree-in-bud type nodules within the left lower lobe, series 6, image 267.    Bolus timing is not optimized for evaluation of the pulmonary arteries.  Allowing for this, no large central pulmonary thromboembolism, and no convincing thromboembolism to the level of the distal lobar branches.    The liver is somewhat hypoattenuating, possibly reflecting steatosis, correlation with LFTs recommended.  The spleen, pancreas, adrenal glands and gallbladder are grossly unremarkable.  The stomach is nondistended, no gastric wall thickening.  There is a small hiatal hernia.  The portal vein, splenic vein, SMV, celiac axis and SMA all are patent.  No significant abdominal lymphadenopathy.  There is a diverticulum arising from the 2nd portion of the duodenum without inflammation.  No abnormal perisplenic or perihepatic fluid.    The kidneys enhance symmetrically without hydronephrosis or nephrolithiasis.  The bilateral ureters are unremarkable without calculi seen.  The urinary bladder is unremarkable.  The uterus is absent the adnexa is unremarkable.    There are a few scattered colonic diverticula without surrounding inflammation to suggest diverticulitis.  The terminal ileum is unremarkable.  The appendix is unremarkable.  The small bowel is grossly unremarkable.  There are a few scattered shotty periaortic, pericaval,  and mesenteric lymph nodes.  There is atherosclerotic calcification of the aorta and its branches.  No focal organized pelvic fluid collection.    There are degenerative changes of the bilateral sacroiliac joints and spine.  There is a hemangioma within the right aspect of L1.  The facet joints are aligned.  The sternum is intact.  No significant axillary lymphadenopathy.  No significant inguinal lymphadenopathy.                                       Medications   morphine injection 2 mg (2 mg Intravenous Given 1/3/25 1248)   iohexoL (OMNIPAQUE 350) injection 80 mL (80 mLs Intravenous Given 1/3/25 1356)     Medical Decision Making  Initial assessment  74-year-old female with past medical history of hypertension and osteoporosis now presents with a chief complaint of left-sided rib pain following a fall.. Patient is able to vocalise, breathing spontaneously, hemodynamically stable, oriented, moving all 4 limbs spontaneously.  Examination consistent with left-sided rib pain and left upper quadrant tenderness.      Differential diagnosis  Broken ribs  Solid organ injury  Lung contusion  Pneumo/hemothorax      ED management  Patient is well-appearing with stable vital signs however exam was concerning for trauma following a fall.  Workup commenced showing CBC and CMP within normal limits including no new anemia. CT chest abdomen pelvis obtained which did not demonstrate any fractures but pulmonary nodule noted and patient was instructed follow up with the primary care doctor for follow up.  Patient discharged with plan to follow up with the primary care doctor and take Tylenol for pain.      Amount and/or Complexity of Data Reviewed  Labs: ordered. Decision-making details documented in ED Course.  Radiology: ordered.    Risk  Prescription drug management.              Attending Attestation:   Physician Attestation Statement for Resident:  As the supervising MD   Physician Attestation Statement: I have personally seen  and examined this patient.   I agree with the above history.  -:   As the supervising MD I agree with the above PE.     As the supervising MD I agree with the above treatment, course, plan, and disposition.                Attending ED Notes:   I have reviewed the case with my resident and agree with the history, review of systems, physical exam, assessment, and plan of care as documented. I have also physically saw the patient and performed an independent HPI and exam. I was immediately available for any procedures.  In short, the patient presented 2/2 a mechanical fall. Benign exam and work-up. Symptoms improved with pain medication while in the ED. Patient able to ambulate without difficulty or assistance. Discussed incidental findings on CT. Will discharge with instructions for symptomatic treatment at home, PCP follow-up and ED return precautions. Patient aware and agreeable to the plan.     Nathaly Estrada          ED Course as of 01/03/25 1732 Fri Jan 03, 2025   1321 WBC: 6.29 [PM]   1321 Hemoglobin: 13.2 [PM]   1321 Platelet Count: 227 [PM]   1334 Comprehensive metabolic panel  Benign [PM]   1334 Urinalysis, Reflex to Urine Culture Urine, Clean Catch(!)  Absence of hematuria and low suspicion for severe kidney injury [PM]      ED Course User Index  [PM] Esau Calderon MD                           Clinical Impression:  Final diagnoses:  [W19.XXXA] Fall, initial encounter (Primary)  [R07.81] Rib pain          ED Disposition Condition    Discharge Stable          ED Prescriptions    None       Follow-up Information       Follow up With Specialties Details Why Contact Info    Katarzyna López MD Internal Medicine   [address] [place] [address]  [phone]      [facility] Emergency Dept Emergency Medicine  As needed, If symptoms worsen [address] [facility] [place] [id number] [phone]             Esau Calderon,  MD  Resident  01/03/25 173       Nathaly Estrada, DO  01/05/25 6877

## 2025-01-07 ENCOUNTER — OFFICE VISIT (OUTPATIENT)
Dept: FAMILY MEDICINE | Facility: CLINIC | Age: 75
End: 2025-01-07
Payer: MEDICARE

## 2025-01-07 VITALS
HEIGHT: 62 IN | BODY MASS INDEX: 26.41 KG/M2 | DIASTOLIC BLOOD PRESSURE: 72 MMHG | SYSTOLIC BLOOD PRESSURE: 128 MMHG | TEMPERATURE: 97 F | RESPIRATION RATE: 16 BRPM | OXYGEN SATURATION: 96 % | HEART RATE: 91 BPM | WEIGHT: 143.5 LBS

## 2025-01-07 DIAGNOSIS — J06.9 URI WITH COUGH AND CONGESTION: ICD-10-CM

## 2025-01-07 DIAGNOSIS — Z09 HOSPITAL DISCHARGE FOLLOW-UP: Primary | ICD-10-CM

## 2025-01-07 DIAGNOSIS — S20.212D RIB CONTUSION, LEFT, SUBSEQUENT ENCOUNTER: ICD-10-CM

## 2025-01-07 DIAGNOSIS — R91.8 PULMONARY NODULES: ICD-10-CM

## 2025-01-07 PROCEDURE — 1125F AMNT PAIN NOTED PAIN PRSNT: CPT | Mod: CPTII,S$GLB,,

## 2025-01-07 PROCEDURE — G2211 COMPLEX E/M VISIT ADD ON: HCPCS | Mod: S$GLB,,,

## 2025-01-07 PROCEDURE — 99999 PR PBB SHADOW E&M-EST. PATIENT-LVL V: CPT | Mod: PBBFAC,,,

## 2025-01-07 PROCEDURE — 1159F MED LIST DOCD IN RCRD: CPT | Mod: CPTII,S$GLB,,

## 2025-01-07 PROCEDURE — 99214 OFFICE O/P EST MOD 30 MIN: CPT | Mod: S$GLB,,,

## 2025-01-07 PROCEDURE — 3078F DIAST BP <80 MM HG: CPT | Mod: CPTII,S$GLB,,

## 2025-01-07 PROCEDURE — 3074F SYST BP LT 130 MM HG: CPT | Mod: CPTII,S$GLB,,

## 2025-01-07 RX ORDER — DOXYCYCLINE 100 MG/1
100 CAPSULE ORAL EVERY 12 HOURS
Qty: 14 CAPSULE | Refills: 0 | Status: SHIPPED | OUTPATIENT
Start: 2025-01-07 | End: 2025-01-14

## 2025-01-07 RX ORDER — PROMETHAZINE HYDROCHLORIDE AND DEXTROMETHORPHAN HYDROBROMIDE 6.25; 15 MG/5ML; MG/5ML
5 SYRUP ORAL EVERY 4 HOURS PRN
Qty: 118 ML | Refills: 0 | Status: SHIPPED | OUTPATIENT
Start: 2025-01-07 | End: 2025-01-17

## 2025-01-07 RX ORDER — CYCLOBENZAPRINE HCL 10 MG
10 TABLET ORAL 3 TIMES DAILY PRN
Qty: 30 TABLET | Refills: 0 | Status: SHIPPED | OUTPATIENT
Start: 2025-01-07 | End: 2025-01-17

## 2025-01-07 NOTE — PROGRESS NOTES
"            Family Medicine      Patient Name: Christi Coreas  MRN: [id number]  : 1950    PCP: Katarzyna López MD       HPI      Chief Complaint:  Hospital follow up    Christi Coreas is a 74 y.o. female with multiple medical diagnoses as listed in the medical history and problem list that presents for hospital follow up.      HPI      Recent hospital encounter. See below encounter note from 2024.    "74-year-old female with past medical history of hypertension and osteoporosis now presents with a chief complaint of left-sided rib pain following a fall.. Patient is able to vocalise, breathing spontaneously, hemodynamically stable, oriented, moving all 4 limbs spontaneously.  Examination consistent with left-sided rib pain and left upper quadrant tenderness.        Differential diagnosis  Broken ribs  Solid organ injury  Lung contusion  Pneumo/hemothorax        ED management  Patient is well-appearing with stable vital signs however exam was concerning for trauma following a fall.  Workup commenced showing CBC and CMP within normal limits including no new anemia. CT chest abdomen pelvis obtained which did not demonstrate any fractures but pulmonary nodule noted and patient was instructed follow up with the primary care doctor for follow up.  Patient discharged with plan to follow up with the primary care doctor and take Tylenol for pain."       History      Past Medical History:  Past Medical History:   Diagnosis Date    Degenerative disc disease     lumbar    Glucose intolerance (impaired glucose tolerance)     Hypertension     LUQ abdominal pain 2015    Non-recurrent acute serous otitis media of right ear 2023       Past Surgical History:  Past Surgical History:   Procedure Laterality Date    APPENDECTOMY      HYSTERECTOMY      OVARIAN CYST SURGERY         Social History:  Social History     Socioeconomic History    Marital status:    Tobacco Use    Smoking status: Never    Smokeless " "tobacco: Never   Substance and Sexual Activity    Alcohol use: Yes     Comment: occacionally wine    Drug use: No     Social Drivers of Health     Financial Resource Strain: High Risk (1/3/2025)    Overall Financial Resource Strain (CARDIA)     Difficulty of Paying Living Expenses: Very hard   Food Insecurity: No Food Insecurity (1/3/2025)    Hunger Vital Sign     Worried About Running Out of Food in the Last Year: Never true     Ran Out of Food in the Last Year: Never true   Transportation Needs: No Transportation Needs (1/3/2025)    PRAPARE - Transportation     Lack of Transportation (Medical): No     Lack of Transportation (Non-Medical): No   Physical Activity: Insufficiently Active (1/3/2025)    Exercise Vital Sign     Days of Exercise per Week: 5 days     Minutes of Exercise per Session: 10 min   Stress: No Stress Concern Present (1/3/2025)    [place] [employer] - Occupational Stress Questionnaire     Feeling of Stress : Not at all   Housing Stability: Low Risk  (1/3/2025)    Housing Stability Vital Sign     Unable to Pay for Housing in the Last Year: No     Homeless in the Last Year: No       Family History:  Family History   Problem Relation Name Age of Onset    Cancer Mother      Hypertension Father      Cancer Sister      Diabetes Sister      Breast cancer Sister      Breast cancer Maternal Aunt         Allergies and Medications: (updated and reviewed)  Review of patient's allergies indicates:   Allergen Reactions    Aspirin     Nexium [esomeprazole magnesium] Other (See Comments)     "bad pain"    Indomethacin Rash     Current Outpatient Medications   Medication Sig Dispense Refill    albuterol (VENTOLIN HFA) 90 mcg/actuation inhaler Inhale 2 puffs into the lungs every 6 (six) hours as needed for Wheezing. Rescue 18 g 5    alendronate (FOSAMAX) 70 MG tablet TAKE ONE TABLET BY MOUTH EVERY 7 DAYS 12 tablet 0    amLODIPine (NORVASC) 5 MG tablet Take 1 tablet (5 mg total) by mouth once " "daily. 90 tablet 3    AREXVY, PF, 120 mcg/0.5 mL SusR vaccine       atorvastatin (LIPITOR) 40 MG tablet Take 1 tablet (40 mg total) by mouth every evening. 90 tablet 3    benazepriL (LOTENSIN) 40 MG tablet Take 1 tablet (40 mg total) by mouth once daily. 90 tablet 3    cholecalciferol, vitamin D3, (VITAMIN D3) 50 mcg (2,000 unit) Cap capsule Take 1 capsule (2,000 Units total) by mouth once daily. 90 capsule 3    citalopram (CELEXA) 40 MG tablet Take 1 tablet by mouth once daily 90 tablet 0    denosumab (PROLIA) 60 mg/mL Syrg Inject 1 mL (60 mg total) into the skin every 6 (six) months. 2 mL 1    gabapentin (NEURONTIN) 300 MG capsule Take 1 capsule (300 mg total) by mouth every evening. 90 capsule 3    levocetirizine (XYZAL) 5 MG tablet Take 1 tablet (5 mg total) by mouth every evening. 90 tablet 3    omeprazole (PRILOSEC) 40 MG capsule Take 1 capsule (40 mg total) by mouth once daily. 90 capsule 3    TWINRIX, PF, 720 LIMA unit- 20 mcg/mL Syrg suspension       doxycycline (VIBRAMYCIN) 100 MG Cap Take 1 capsule (100 mg total) by mouth every 12 (twelve) hours. for 7 days 14 capsule 0    promethazine-dextromethorphan (PROMETHAZINE-DM) 6.25-15 mg/5 mL Syrp Take 5 mLs by mouth every 4 (four) hours as needed (cough and congestion). 118 mL 0     No current facility-administered medications for this visit.       Patient Care Team:  Katarzyna López MD as PCP - General (Internal Medicine)  Tania Walsh as Community Health Worker         Exam      Review of Systems:  (as noted above)  Review of Systems    Physical Exam:   Physical Exam  Vitals:    01/07/25 0920   BP: 128/72   BP Location: Left arm   Patient Position: Sitting   Pulse: 91   Resp: 16   Temp: 97.1 °F (36.2 °C)   TempSrc: Temporal   SpO2: 96%   Weight: 65.1 kg (143 lb 8.3 oz)   Height: 5' 2" (1.575 m)      Body mass index is 26.25 kg/m².         Assessment & Plan      1. Hospital discharge follow-up  - Symptoms improving. Patient still in pain from fall.    2. URI " with cough and congestion  - promethazine-dextromethorphan (PROMETHAZINE-DM) 6.25-15 mg/5 mL Syrp; Take 5 mLs by mouth every 4 (four) hours as needed (cough and congestion).  Dispense: 118 mL; Refill: 0  - doxycycline (VIBRAMYCIN) 100 MG Cap; Take 1 capsule (100 mg total) by mouth every 12 (twelve) hours. for 7 days  Dispense: 14 capsule; Refill: 0    3. Pulmonary nodules  - Ambulatory referral/consult to Pulmonology; Future        Family and/or Caretaker present at visit? Yes/No  Medication Reconciliation:  Completed  New Prescriptions filled after discharge: Yes  Hospital encounter notes, objective/subjective data, diagnostics, and plan of care from recent hospital encounter reviewed: Yes  Discharge summary reviewed:  Yes  Disease/illness education: completed  Follow up appointments scheduled:  No  Follow up labs/tests ordered: No  Home Health ordered on discharge: Patient does not have home health established from hospital visit.  They do not need home health.  If needed, we will set up home health for the patient  Establishment or re-establishment of referral orders for community resources: No  DME ordered at discharge: No  How patient is feeling since discharge from the hospital?  Reports symptoms have improved    Discussion with other health care providers: No      Discussed condition, and treatment.   Education sent to patient portal/included in after visit summary.  ED precautions given.   Notify provider if symptoms do not resolve or increase in severity.   Patient verbalizes understanding and agrees with plan of care.      --------------------------------------------      Health Maintenance:  Health Maintenance         Date Due Completion Date    Hemoglobin A1c (Prediabetes) 11/07/2025 11/7/2024    DEXA Scan 03/28/2026 3/28/2024    TETANUS VACCINE 09/09/2026 9/9/2016    Override on 9/9/2016: Done (fax from [facility])    Lipid Panel 11/07/2029 11/7/2024            Health maintenance reviewed    Follow Up:  No  follow-ups on file.             - The patient is given an After Visit Summary that lists all medications with directions, allergies, education, orders placed during this encounter and follow-up instructions.      - I have reviewed the patient's medical information including past medical, family, and social history sections including the medications and allergies.      - We discussed the patient's current medications.     This note was created by combination of typed  and MModal dictation.  Transcription errors may be present.  If there are any questions, please contact me.       Clau Mays PA-C  [facility]

## 2025-01-07 NOTE — LETTER
January 7, 2025      [facility]  [address] [facility] [address]  Phone: [phone]  Fax: [phone]       Patient: Christi Coreas   YOB: 1950  Date of Visit: 01/07/2025    To Whom It May Concern:    Becki Coreas  was at [facility] on 01/07/2025. The patient may return to work/school on 01/08/25 with restrictions. Restrictions include: no heavy lifting. If you have any questions or concerns, or if I can be of further assistance, please do not hesitate to contact me.    Sincerely,    Clau Mays PA-C

## 2025-01-09 ENCOUNTER — HOSPITAL ENCOUNTER (OUTPATIENT)
Dept: RADIOLOGY | Facility: HOSPITAL | Age: 75
Discharge: HOME | End: 2025-01-09
Payer: MEDICARE

## 2025-01-09 DIAGNOSIS — Z12.31 ENCOUNTER FOR SCREENING MAMMOGRAM FOR BREAST CANCER: ICD-10-CM

## 2025-01-09 PROCEDURE — 77067 SCR MAMMO BI INCL CAD: CPT | Mod: 26,,, | Performed by: RADIOLOGY

## 2025-01-09 PROCEDURE — 77063 BREAST TOMOSYNTHESIS BI: CPT | Mod: TC,PO

## 2025-01-09 PROCEDURE — 77063 BREAST TOMOSYNTHESIS BI: CPT | Mod: 26,,, | Performed by: RADIOLOGY

## 2025-01-15 ENCOUNTER — OFFICE VISIT (OUTPATIENT)
Dept: PULMONOLOGY | Facility: CLINIC | Age: 75
End: 2025-01-15
Payer: MEDICARE

## 2025-01-15 VITALS
WEIGHT: 142.19 LBS | OXYGEN SATURATION: 94 % | DIASTOLIC BLOOD PRESSURE: 74 MMHG | HEIGHT: 62 IN | HEART RATE: 89 BPM | BODY MASS INDEX: 26.17 KG/M2 | SYSTOLIC BLOOD PRESSURE: 136 MMHG

## 2025-01-15 DIAGNOSIS — R06.09 DOE (DYSPNEA ON EXERTION): Primary | ICD-10-CM

## 2025-01-15 DIAGNOSIS — R91.8 PULMONARY NODULES: ICD-10-CM

## 2025-01-15 PROCEDURE — 99204 OFFICE O/P NEW MOD 45 MIN: CPT | Mod: S$GLB,,, | Performed by: EMERGENCY MEDICINE

## 2025-01-15 PROCEDURE — 3075F SYST BP GE 130 - 139MM HG: CPT | Mod: CPTII,S$GLB,, | Performed by: EMERGENCY MEDICINE

## 2025-01-15 PROCEDURE — 99999 PR PBB SHADOW E&M-EST. PATIENT-LVL IV: CPT | Mod: PBBFAC,,, | Performed by: EMERGENCY MEDICINE

## 2025-01-15 PROCEDURE — 3288F FALL RISK ASSESSMENT DOCD: CPT | Mod: CPTII,S$GLB,, | Performed by: EMERGENCY MEDICINE

## 2025-01-15 PROCEDURE — 1101F PT FALLS ASSESS-DOCD LE1/YR: CPT | Mod: CPTII,S$GLB,, | Performed by: EMERGENCY MEDICINE

## 2025-01-15 PROCEDURE — 1159F MED LIST DOCD IN RCRD: CPT | Mod: CPTII,S$GLB,, | Performed by: EMERGENCY MEDICINE

## 2025-01-15 PROCEDURE — 3078F DIAST BP <80 MM HG: CPT | Mod: CPTII,S$GLB,, | Performed by: EMERGENCY MEDICINE

## 2025-02-12 ENCOUNTER — NURSE TRIAGE (OUTPATIENT)
Dept: ADMINISTRATIVE | Facility: CLINIC | Age: 75
End: 2025-02-12
Payer: MEDICARE

## 2025-02-12 NOTE — TELEPHONE ENCOUNTER
Spoke with pt who reports that she is having pain tot right ear. Denies fever at this time, and denies drainage from ear. Advised to be seen today or tomorrow in office. Appointment scheduled      Reason for Disposition   All other earaches (Exceptions: Brief ear pain lasting < 1 hour, and earache occurring during air travel.)    Additional Information   Negative: Sounds like a life-threatening emergency to the triager   Negative: Pink or red swelling behind the ear   Negative: Stiff neck (can't touch chin to chest)   Negative: Patient sounds very sick or weak to the triager   Negative: SEVERE earache pain (e.g., excruciating)   Negative: Fever > 103 F (39.4 C)   Negative: Ear pain right after long - thin object was inserted into the ear canal (e.g., paper clip, pencil, Q-tip, wire)   Negative: White, yellow, or green discharge (pus)   Negative: Diabetes mellitus or a weak immune system (e.g., HIV positive, cancer chemotherapy, transplant patient)   Negative: Bloody discharge or unexplained bleeding from ear canal   Negative: New blurred vision or vision changes    Protocols used: Earache-A-OH

## 2025-02-13 ENCOUNTER — OFFICE VISIT (OUTPATIENT)
Dept: FAMILY MEDICINE | Facility: CLINIC | Age: 75
End: 2025-02-13
Payer: MEDICARE

## 2025-02-13 VITALS
WEIGHT: 143.31 LBS | OXYGEN SATURATION: 95 % | SYSTOLIC BLOOD PRESSURE: 132 MMHG | HEART RATE: 90 BPM | TEMPERATURE: 98 F | BODY MASS INDEX: 26.37 KG/M2 | RESPIRATION RATE: 18 BRPM | HEIGHT: 62 IN | DIASTOLIC BLOOD PRESSURE: 80 MMHG

## 2025-02-13 DIAGNOSIS — R07.89 INTERMITTENT RIGHT-SIDED CHEST PAIN: ICD-10-CM

## 2025-02-13 DIAGNOSIS — H92.01 RIGHT EAR PAIN: Primary | ICD-10-CM

## 2025-02-13 PROCEDURE — 99999 PR PBB SHADOW E&M-EST. PATIENT-LVL IV: CPT | Mod: PBBFAC,,,

## 2025-02-13 RX ORDER — HYDROCORTISONE AND ACETIC ACID 20.75; 10.375 MG/ML; MG/ML
3 SOLUTION AURICULAR (OTIC) 2 TIMES DAILY
Qty: 10 ML | Refills: 0 | Status: SHIPPED | OUTPATIENT
Start: 2025-02-13 | End: 2025-02-18

## 2025-02-13 RX ORDER — DEXTROMETHORPHAN HYDROBROMIDE, GUAIFENESIN 5; 100 MG/5ML; MG/5ML
650 LIQUID ORAL EVERY 8 HOURS
Qty: 30 TABLET | Refills: 0 | Status: SHIPPED | OUTPATIENT
Start: 2025-02-13 | End: 2025-02-23

## 2025-02-13 NOTE — PROGRESS NOTES
Family Medicine     Patient name: Christi Coreas  MRN: [id number]  : 1950  PCP NAME: Katarzyna López MD    Subjective     History of Present Illness:  Patient ID: Christi Coreas is a 74 y.o. White female presents to the clinic today. Chronic medical issues, if present, have been documented.   Active Problem List with Overview Notes    Diagnosis Date Noted    Cough 2024    Back pain 2024    Nausea 2024    Encounter for screening mammogram for breast cancer 2024    Flatulence 2024    Epigastric pain 2024    Seasonal allergic rhinitis 2024    Age-related osteoporosis without current pathological fracture 2024    GERD (gastroesophageal reflux disease) 2015    DDD (degenerative disc disease), lumbar 2014    Glucose intolerance (impaired glucose tolerance)     Anxiety 2013    Essential hypertension, benign 2013    DDD (degenerative disc disease), cervical 2012       Chief Complaint  Chief Complaint   Patient presents with    Otalgia    Follow-up     Pain under breast from fall on  / green mucus/  ear pain with cough      Presents for follow-up of fall from standing height.  Was seen in ER last month after fall from standing height. No findings CT scan to suggest acute solid organ injury of the chest, abdomen, or pelvis.  Was discharged with conservative management.  Followed up with another clinician with respiratory symptoms.  Was started on doxycycline which she has just recently completed.  Also established with pulmonology.  Still complains of pain in the area of chest where impact was made.  Also has some cough productive of greenish sputum.  No shortness of breath.  Also complains of itching in the right ear.  Also has vague discomfort in the ear.  No fever    Medications   Medication List with Changes/Refills   New Medications    ACETAMINOPHEN (TYLENOL) 650 MG TBSR    Take 1 tablet (650 mg total) by mouth every 8  "(eight) hours. for 10 days    ACETIC ACID-HYDROCORTISONE (VOSOL-HC) OTIC SOLUTION    Place 3 drops into the right ear 2 (two) times daily. for 5 days   Current Medications    ALBUTEROL (VENTOLIN HFA) 90 MCG/ACTUATION INHALER    Inhale 2 puffs into the lungs every 6 (six) hours as needed for Wheezing. Rescue    ALENDRONATE (FOSAMAX) 70 MG TABLET    TAKE ONE TABLET BY MOUTH EVERY 7 DAYS    AMLODIPINE (NORVASC) 5 MG TABLET    Take 1 tablet (5 mg total) by mouth once daily.    AREXVY, PF, 120 MCG/0.5 ML SUSR VACCINE        ATORVASTATIN (LIPITOR) 40 MG TABLET    Take 1 tablet (40 mg total) by mouth every evening.    BENAZEPRIL (LOTENSIN) 40 MG TABLET    Take 1 tablet (40 mg total) by mouth once daily.    CHOLECALCIFEROL, VITAMIN D3, (VITAMIN D3) 50 MCG (2,000 UNIT) CAP CAPSULE    Take 1 capsule (2,000 Units total) by mouth once daily.    CITALOPRAM (CELEXA) 40 MG TABLET    Take 1 tablet by mouth once daily    DENOSUMAB (PROLIA) 60 MG/ML SYRG    Inject 1 mL (60 mg total) into the skin every 6 (six) months.    GABAPENTIN (NEURONTIN) 300 MG CAPSULE    Take 1 capsule (300 mg total) by mouth every evening.    LEVOCETIRIZINE (XYZAL) 5 MG TABLET    Take 1 tablet (5 mg total) by mouth every evening.    OMEPRAZOLE (PRILOSEC) 40 MG CAPSULE    Take 1 capsule (40 mg total) by mouth once daily.    TWINRIX, PF, 720 LIMA UNIT- 20 MCG/ML SYRG SUSPENSION           Allergies  Review of patient's allergies indicates:   Allergen Reactions    Aspirin     Nexium [esomeprazole magnesium] Other (See Comments)     "bad pain"    Indomethacin Rash     Past Medical history  Past Medical History:   Diagnosis Date    Degenerative disc disease     lumbar    Glucose intolerance (impaired glucose tolerance)     Hypertension     LUQ abdominal pain 04/16/2015    Non-recurrent acute serous otitis media of right ear 11/29/2023          Surgical History  Past Surgical History:   Procedure Laterality Date    APPENDECTOMY      HYSTERECTOMY      OVARIAN CYST " "SURGERY       Family History  Family History   Problem Relation Name Age of Onset    Cancer Mother      Hypertension Father      Cancer Sister      Diabetes Sister      Breast cancer Sister      Breast cancer Maternal Aunt       Social History  Social History     Tobacco Use    Smoking status: Never    Smokeless tobacco: Never   Substance Use Topics    Alcohol use: Yes     Comment: occacionally wine    Drug use: No          Review of system     ROS  Negative except as mentioned above  Physical exam   Vital Signs  Vitals:    02/13/25 0831   BP: 132/80   Pulse: 90   Resp: 18   Temp: 98.2 °F (36.8 °C)   TempSrc: Oral   SpO2: 95%   Weight: 65 kg (143 lb 4.8 oz)   Height: 5' 2" (1.575 m)       Physical Exam      Wt Readings from Last 3 Encounters:   02/13/25 65 kg (143 lb 4.8 oz)   01/15/25 64.5 kg (142 lb 3.2 oz)   01/07/25 65.1 kg (143 lb 8.3 oz)          Body mass index is 26.21 kg/m².      Laboratory data and other diagnostic findings     Lab Results   Component Value Date    WBC 6.29 01/03/2025    HGB 13.2 01/03/2025    HCT 39.4 01/03/2025    MCV 91 01/03/2025     01/03/2025         Lab Results   Component Value Date    CREATININE 0.9 01/03/2025    BUN 17 01/03/2025     01/03/2025    K 3.9 01/03/2025     01/03/2025    CO2 25 01/03/2025         Assessment and plan       Right ear pain  Has just completed a course of doxycycline.  No further antibiotics for now.  If symptoms persist after otic drops, patient to contact provider  -     acetic acid-hydrocortisone (VOSOL-HC) otic solution; Place 3 drops into the right ear 2 (two) times daily. for 5 days  Dispense: 10 mL; Refill: 0    Intermittent right-sided chest pain  No evidence of bony injury on imaging.  Scheduled Tylenol for now  -     acetaminophen (TYLENOL) 650 MG TbSR; Take 1 tablet (650 mg total) by mouth every 8 (eight) hours. for 10 days  Dispense: 30 tablet; Refill: 0             Problem List Items Addressed This Visit    None  Visit " Diagnoses       Right ear pain    -  Primary    Relevant Medications    acetic acid-hydrocortisone (VOSOL-HC) otic solution    Intermittent right-sided chest pain        Relevant Medications    acetaminophen (TYLENOL) 650 MG TbSR                Medications Administered this visit       Future Appointments  Future Appointments   Date Time Provider Department Center   4/21/2025 11:00 AM [facility] OI-CT1 500 LB LIMIT [facility]   4/21/2025 11:40 AM [facility] PUL [facility]   4/21/2025 12:00 PM [facility]MON[facility] FUNCTION [facility] PUL[facility]   4/21/2025  1:00 PM Singh Sotomayor MD [facility] PULMSVC [facility]             Sander Doss MD    This office note was created by combination of typed  and MModal dictation.  Transcription errors may be present.  If there are any questions, please contact me.

## 2025-02-20 ENCOUNTER — PATIENT OUTREACH (OUTPATIENT)
Dept: ADMINISTRATIVE | Facility: OTHER | Age: 75
End: 2025-02-20
Payer: MEDICARE

## 2025-02-24 DIAGNOSIS — Z00.00 ENCOUNTER FOR MEDICARE ANNUAL WELLNESS EXAM: ICD-10-CM

## 2025-04-09 DIAGNOSIS — M89.9 DISORDER OF BONE AND ARTICULAR CARTILAGE: ICD-10-CM

## 2025-04-09 DIAGNOSIS — M94.9 DISORDER OF BONE AND ARTICULAR CARTILAGE: ICD-10-CM

## 2025-04-09 DIAGNOSIS — I10 ESSENTIAL HYPERTENSION, BENIGN: ICD-10-CM

## 2025-04-09 NOTE — TELEPHONE ENCOUNTER
Care Due:                  Date            Visit Type   Department     Provider  --------------------------------------------------------------------------------                                SAME DAY -                              ESTABLISHED   [facility]  Last Visit: 02-      PATIENT      MEDICINE       Sander Doss  Next Visit: None Scheduled  None         None Found                                                            Last  Test          Frequency    Reason                     Performed    Due Date  --------------------------------------------------------------------------------    Mg Level....  12 months..  alendronate..............  Not Found    Overdue    Phosphate...  12 months..  alendronate..............  Not Found    Overdue    [employer] Embedded Care Due Messages. Reference number: [id number].   4/09/2025 10:05:27 AM CDT

## 2025-04-10 RX ORDER — ALENDRONATE SODIUM 70 MG/1
70 TABLET ORAL
Qty: 12 TABLET | Refills: 0 | OUTPATIENT
Start: 2025-04-10

## 2025-04-10 RX ORDER — AMLODIPINE BESYLATE 5 MG/1
5 TABLET ORAL DAILY
Qty: 90 TABLET | Refills: 3 | Status: SHIPPED | OUTPATIENT
Start: 2025-04-10 | End: 2026-04-10

## 2025-04-21 ENCOUNTER — HOSPITAL ENCOUNTER (OUTPATIENT)
Dept: PULMONOLOGY | Facility: CLINIC | Age: 75
Discharge: HOME | End: 2025-04-21
Payer: MEDICARE

## 2025-04-21 ENCOUNTER — OFFICE VISIT (OUTPATIENT)
Dept: PULMONOLOGY | Facility: CLINIC | Age: 75
End: 2025-04-21
Payer: MEDICARE

## 2025-04-21 ENCOUNTER — HOSPITAL ENCOUNTER (OUTPATIENT)
Dept: RADIOLOGY | Facility: HOSPITAL | Age: 75
Discharge: HOME | End: 2025-04-21
Payer: MEDICARE

## 2025-04-21 VITALS
DIASTOLIC BLOOD PRESSURE: 76 MMHG | WEIGHT: 136 LBS | HEIGHT: 62 IN | SYSTOLIC BLOOD PRESSURE: 138 MMHG | HEART RATE: 78 BPM | OXYGEN SATURATION: 100 % | BODY MASS INDEX: 25.03 KG/M2

## 2025-04-21 VITALS — WEIGHT: 143 LBS | BODY MASS INDEX: 26.31 KG/M2 | HEIGHT: 62 IN

## 2025-04-21 DIAGNOSIS — R06.09 DOE (DYSPNEA ON EXERTION): ICD-10-CM

## 2025-04-21 DIAGNOSIS — R91.8 PULMONARY NODULES: ICD-10-CM

## 2025-04-21 DIAGNOSIS — R06.09 DOE (DYSPNEA ON EXERTION): Primary | ICD-10-CM

## 2025-04-21 PROCEDURE — 94727 GAS DIL/WSHOT DETER LNG VOL: CPT | Mod: S$GLB,,, | Performed by: INTERNAL MEDICINE

## 2025-04-21 PROCEDURE — 3075F SYST BP GE 130 - 139MM HG: CPT | Mod: CPTII,S$GLB,, | Performed by: EMERGENCY MEDICINE

## 2025-04-21 PROCEDURE — 94618 PULMONARY STRESS TESTING: CPT | Mod: S$GLB,,, | Performed by: INTERNAL MEDICINE

## 2025-04-21 PROCEDURE — 1101F PT FALLS ASSESS-DOCD LE1/YR: CPT | Mod: CPTII,S$GLB,, | Performed by: EMERGENCY MEDICINE

## 2025-04-21 PROCEDURE — 99214 OFFICE O/P EST MOD 30 MIN: CPT | Mod: 25,S$GLB,, | Performed by: EMERGENCY MEDICINE

## 2025-04-21 PROCEDURE — 3288F FALL RISK ASSESSMENT DOCD: CPT | Mod: CPTII,S$GLB,, | Performed by: EMERGENCY MEDICINE

## 2025-04-21 PROCEDURE — 94060 EVALUATION OF WHEEZING: CPT | Mod: 59,S$GLB,, | Performed by: INTERNAL MEDICINE

## 2025-04-21 PROCEDURE — 71250 CT THORAX DX C-: CPT | Mod: 26,,, | Performed by: STUDENT IN AN ORGANIZED HEALTH CARE EDUCATION/TRAINING PROGRAM

## 2025-04-21 PROCEDURE — 71250 CT THORAX DX C-: CPT | Mod: TC

## 2025-04-21 PROCEDURE — 1159F MED LIST DOCD IN RCRD: CPT | Mod: CPTII,S$GLB,, | Performed by: EMERGENCY MEDICINE

## 2025-04-21 PROCEDURE — 1160F RVW MEDS BY RX/DR IN RCRD: CPT | Mod: CPTII,S$GLB,, | Performed by: EMERGENCY MEDICINE

## 2025-04-21 PROCEDURE — 99999 PR PBB SHADOW E&M-EST. PATIENT-LVL II: CPT | Mod: PBBFAC,,, | Performed by: EMERGENCY MEDICINE

## 2025-04-21 PROCEDURE — 3078F DIAST BP <80 MM HG: CPT | Mod: CPTII,S$GLB,, | Performed by: EMERGENCY MEDICINE

## 2025-04-21 PROCEDURE — 94729 DIFFUSING CAPACITY: CPT | Mod: S$GLB,,, | Performed by: INTERNAL MEDICINE

## 2025-04-21 NOTE — PROGRESS NOTES
Pulmonary & Critical Care Medicine   Consultation Note     Reason for Consultation: Abnormal CT Chest      HPI: ED follow up- 74-year-old female with past medical history of hypertension and osteoporosis now presents with a chief complaint of left-sided rib pain following a fall on 1/2. Patient reports that she was lying on her couch and fell off landing on the left side of her ribs and left elbow. Patient did not hit her head or neck and denies any headaches neck pains, new numbness, or weakness. Patient however does tell me that she is experiencing severe pain on the left side of her chest wall and hurts when she takes a deep breath. Additionally patient reports abdominal pain underlying her affected ribs.   Seen by PCP and referred for abnormalities on chest imaging.      1/15: Currently, patient is still having pain on deep inhalation. She notices the left rib pain when walking as well. This is significantly improved. She has noticed shortness of breath for about 1 year. She feels as though this has worsened over the past few months. She denies orthopnea but is not able to lay flat due to cough. She notices the phlegm is worse when she first wakes up.    She endorses cough during this time as well. She feels that cough is worse when the weather changes. When she has this cough, it is productive for green phlegm. She takes allergy medications that help with this.   She was treated for suspected bronchitis in December. She got an albuterol inhaler which she takes for her shortness of breath and it helps relieves her symptoms.   She again was having productive cough and her PCP had started her on doxycycline, she started this on the 10th. This has been helping her cough.   No recent travel or sick contacts. She is from [facility].   Previous history of stomach ulcers, first noted when she was 16. She is currently taking omeprazole which releives her GERD symptoms.      Additional Pulmonary History:    Occupational/Environmental Exposures: [employer] . Previously had mold in the house  Exposure to Animals/Pets: dog and cat  Travel History: last went to [facility] in 2020  History of exposures to TB: no  Family History of Lung Cancer: no  Childhood history of Lung Disease: no  Tobacco use- no  OAC/Anti-PLT- no  Chest surgery/trauma- fall on 1/2 and hit left lower ribs  PNA history- PNA twice in the past, over 10 years ago. no intubation history      INTERVAL HISTORY-   -- Doing OK. No cough or sputum production.   -- Still with SON on activity.   -- No wheezing.   -- Feels chest tightness at times with activity, but not consistently.   -- No rest pain/tightness  -- Denies edema/syncope   -- No systemic features     PFTs + Walk + Repeat CXr completed this AM.   No additional complaints.               Past Medical History:   Diagnosis Date    Degenerative disc disease       lumbar    Glucose intolerance (impaired glucose tolerance)      Hypertension      LUQ abdominal pain 04/16/2015    Non-recurrent acute serous otitis media of right ear 11/29/2023            Past Surgical History:   Procedure Laterality Date    APPENDECTOMY        HYSTERECTOMY        OVARIAN CYST SURGERY          Social History:   Social History            Socioeconomic History    Marital status:    Tobacco Use    Smoking status: Never    Smokeless tobacco: Never   Substance and Sexual Activity    Alcohol use: Yes       Comment: occacionally wine    Drug use: No      Social Drivers of Health           Financial Resource Strain: High Risk (1/3/2025)     Overall Financial Resource Strain (CARDIA)      Difficulty of Paying Living Expenses: Very hard   Food Insecurity: No Food Insecurity (1/3/2025)     Hunger Vital Sign      Worried About Running Out of Food in the Last Year: Never true      Ran Out of Food in the Last Year: Never true   Transportation Needs: No Transportation Needs (1/3/2025)     PRAPARE - Transportation      Lack of  "Transportation (Medical): No      Lack of Transportation (Non-Medical): No   Physical Activity: Insufficiently Active (1/3/2025)     Exercise Vital Sign      Days of Exercise per Week: 5 days      Minutes of Exercise per Session: 10 min   Stress: No Stress Concern Present (1/3/2025)     [place] [employer] - Occupational Stress Questionnaire      Feeling of Stress : Not at all   Housing Stability: Low Risk  (1/3/2025)     Housing Stability Vital Sign      Unable to Pay for Housing in the Last Year: No      Homeless in the Last Year: No             Family History   Problem Relation Name Age of Onset    Cancer Mother        Hypertension Father        Cancer Sister        Diabetes Sister        Breast cancer Sister        Breast cancer Maternal Aunt          Drug Allergies:         Review of patient's allergies indicates:   Allergen Reactions    Aspirin      Nexium [esomeprazole magnesium] Other (See Comments)       "bad pain"    Indomethacin Rash            Review of Systems:   A comprehensive 12-point review of systems was performed, and is negative except for those items mentioned above in the HPI section of this note.      Vital Signs:    /76 (BP Location: Right arm, Patient Position: Sitting)   Pulse 78   Ht 5' 2" (1.575 m)   Wt 61.7 kg (136 lb)   SpO2 100%   BMI 24.87 kg/m²        Physical Exam:   GEN- NAD AAOx3 Well Built, Well Appearing   HEENT- ATNC, PERRLA, EOMI, OP-Cl. No JVD, LAD or bruit noted. Trachea Midline.   CV- RRR No M/R/G  RESP- CTA-Bilateral   GI- S/NT/ND. No HSM Noted  BACK- Spine midline. No step off, crepitus or deformity noted. No midline TTP.   Ext- MAEW, No deformity. No edema or rashes noted.   Neuro- Strength 5/5 symmetric. CN 2-12 intact. Normal gait. Normal sensation.       Personal Review and Summary of Prior Diagnostics     Laboratory Studies: Reviewed        Latest Reference Range & Units Most Recent   WBC 3.90 - 12.70 K/uL 6.29  1/3/25 12:48   RBC 4.00 " - 5.40 M/uL 4.34  1/3/25 12:48   Hemoglobin 12.0 - 16.0 g/dL 13.2  1/3/25 12:48   Hematocrit 37.0 - 48.5 % 39.4  1/3/25 12:48   MCV 82 - 98 fL 91  1/3/25 12:48   MCH 27.0 - 31.0 pg 30.4  1/3/25 12:48   MCHC 32.0 - 36.0 g/dL 33.5  1/3/25 12:48   RDW 11.5 - 14.5 % 12.3  1/3/25 12:48   Platelet Count 150 - 450 K/uL 227  1/3/25 12:48   MPV 9.2 - 12.9 fL 10.5  1/3/25 12:48   Platelet Estimate Normal  Normal  5/8/07 11:26   Gran % 38.0 - 73.0 % 65.3  1/3/25 12:48   Lymph % 18.0 - 48.0 % 19.9  1/3/25 12:48   Lymphs 25 - 40 % 36  5/8/07 11:26   Mono % 4.0 - 15.0 % 10.7  1/3/25 12:48   Monocytes 0 - 10 % 6  5/8/07 11:26   Eos % 0.0 - 8.0 % 2.4  1/3/25 12:48   Basophil % 0.0 - 1.9 % 1.1  1/3/25 12:48   Basophils 0 - 2 % 1  5/8/07 11:26   Immature Granulocytes 0.0 - 0.5 % 0.6 (H)  1/3/25 12:48   Gran # (ANC) 1.8 - 7.7 K/uL 4.1  1/3/25 12:48   Lymph # 1.0 - 4.8 K/uL 1.3  1/3/25 12:48   Mono # 0.3 - 1.0 K/uL 0.7  1/3/25 12:48   Eos # 0.0 - 0.5 K/uL 0.2  1/3/25 12:48   Baso # 0.00 - 0.20 K/uL 0.07  1/3/25 12:48   Immature Grans (Abs) 0.00 - 0.04 K/uL 0.04  1/3/25 12:48   SEGS 50 - 70 % 57  5/8/07 11:26   nRBC 0 /100 WBC 0  1/3/25 12:48   Differential Method   Automated  1/3/25 12:48   Aniso   sl  5/8/07 11:26   Ferritin 20.0 - 300.0 ng/mL 194  2/17/14 13:40   Vitamin B12 210 - 950 pg/mL 520  2/17/14 13:40   Methlymalonic Acid <0.40 umol/L 0.20  2/17/14 13:40   PT 9.0 - 12.5 sec 10.5  1/28/10 09:57   INR 0.8 - 1.2  1.0  1/28/10 09:57   PTT 21.0 - 32.0 sec 27.4  1/28/10 09:57   Sodium 136 - 145 mmol/L 139  1/3/25 12:48   Potassium 3.5 - 5.1 mmol/L 3.9  1/3/25 12:48   Chloride 95 - 110 mmol/L 106  1/3/25 12:48   CO2 23 - 29 mmol/L 25  1/3/25 12:48   Anion Gap 8 - 16 mmol/L 8  1/3/25 12:48   BUN 8 - 23 mg/dL 17  1/3/25 12:48   Creatinine 0.5 - 1.4 mg/dL 0.9  1/3/25 12:48   eGFR >60 mL/min/1.73 m^2 >60  1/3/25 12:48   eGFR if non African American >60 mL/min/1.73 m^2 >60.0  10/12/20 09:47   eGFR if African American >60 mL/min/1.73 m^2  >60.0  10/12/20 09:47   Glucose 70 - 110 mg/dL 107  1/3/25 12:48   Calcium 8.7 - 10.5 mg/dL 10.1  1/3/25 12:48   ALP 40 - 150 U/L 89  1/3/25 12:48   PROTEIN TOTAL 6.0 - 8.4 g/dL 8.1  1/3/25 12:48   Albumin 3.5 - 5.2 g/dL 4.4  1/3/25 12:48   BILIRUBIN TOTAL 0.1 - 1.0 mg/dL 0.2  1/3/25 12:48   Bilirubin Direct 0.0 - 0.2 mg/dl <0.1  2/14/06 14:36   AST 10 - 40 U/L 27  1/3/25 12:48   ALT 10 - 44 U/L 16  1/3/25 12:48   Amylase Level 20 - 110 U/L 45  11/7/24 14:16   Lipase 4 - 60 U/L 42  11/7/24 14:16   (H): Data is abnormally high     Microbiology Data: Reviewed      Summary of Chest Imaging Personally Reviewed:      1. No findings to suggest acute solid organ injury of the chest, abdomen, or pelvis.  2. Findings suggest hepatic steatosis, correlation with LFTs recommended.  3. Colonic diverticulosis without diverticulitis.  4. Small cluster of tree-in-bud type nodules within the periphery of the left lower lobe, developing infectious or inflammatory process is a consideration.  Correlation is advised, short-term follow-up recommended to ensure resolution and to exclude discrete pulmonary nodule.          2D Echo: None      PFT's              Assessment:     No diagnosis found.     Encounter Medications[1]  No orders of the defined types were placed in this encounter.      Plan:       # Pulmonary nodules- tree in bud- resolved on repeat CT. Calcified granuloma LLL. No need for additional imaging follow up.   # Dyspnea on exertion- PFTs with no obstruction or restriction.. Mild DLCO reduction, but FEV1 and FVC >>100% predicted. Her SON does not appear to be a result of pulmonary limitation.   # Productive cough- Improved. Imaging improved.      --Obtain [facility]  -- Cards referral to evaluate for cardiac etiologies of SON   -- No need for repeat chest imaging.      Discussed with patient in detail. RTC PRN      Singh Sotomayor MD          [1]   Outpatient Encounter Medications as of 4/21/2025   Medication Sig Dispense  Refill    albuterol (VENTOLIN HFA) 90 mcg/actuation inhaler Inhale 2 puffs into the lungs every 6 (six) hours as needed for Wheezing. Rescue 18 g 5    alendronate (FOSAMAX) 70 MG tablet TAKE ONE TABLET BY MOUTH EVERY 7 DAYS 12 tablet 0    amLODIPine (NORVASC) 5 MG tablet Take 1 tablet (5 mg total) by mouth once daily. 90 tablet 3    AREXVY, PF, 120 mcg/0.5 mL SusR vaccine       atorvastatin (LIPITOR) 40 MG tablet Take 1 tablet (40 mg total) by mouth every evening. 90 tablet 3    benazepriL (LOTENSIN) 40 MG tablet Take 1 tablet (40 mg total) by mouth once daily. 90 tablet 3    [] cholecalciferol, vitamin D3, (VITAMIN D3) 50 mcg (2,000 unit) Cap capsule Take 1 capsule (2,000 Units total) by mouth once daily. 90 capsule 3    citalopram (CELEXA) 40 MG tablet Take 1 tablet by mouth once daily 90 tablet 0    denosumab (PROLIA) 60 mg/mL Syrg Inject 1 mL (60 mg total) into the skin every 6 (six) months. 2 mL 1    gabapentin (NEURONTIN) 300 MG capsule Take 1 capsule (300 mg total) by mouth every evening. 90 capsule 3    levocetirizine (XYZAL) 5 MG tablet Take 1 tablet (5 mg total) by mouth every evening. 90 tablet 3    omeprazole (PRILOSEC) 40 MG capsule Take 1 capsule (40 mg total) by mouth once daily. 90 capsule 3    TWINRIX, PF, 720 LIMA unit- 20 mcg/mL Syrg suspension       [DISCONTINUED] amLODIPine (NORVASC) 5 MG tablet Take 1 tablet (5 mg total) by mouth once daily. 90 tablet 3     No facility-administered encounter medications on file as of 2025.

## 2025-04-22 LAB
DLCO SINGLE BREATH LLN: 13.43
DLCO SINGLE BREATH PRE REF: 69.7 %
DLCO SINGLE BREATH REF: 19.16
DLCOC SBVA LLN: 2.63
DLCOC SBVA REF: 4.16
DLCOC SINGLE BREATH LLN: 13.43
DLCOC SINGLE BREATH REF: 19.16
DLCOCSBVAULN: 5.7
DLCOCSINGLEBREATHULN: 24.9
DLCOSINGLEBREATHULN: 24.9
DLCOSINGLEBREATHZSCORE: -1.67
DLCOVA LLN: 2.63
DLCOVA PRE REF: 86 %
DLCOVA PRE: 3.58 ML/(MIN*MMHG*L) (ref 2.63–5.7)
DLCOVA REF: 4.16
DLCOVAULN: 5.7
ERVN2 LLN: -16449.46
ERVN2 PRE REF: 52.3 %
ERVN2 PRE: 0.28 L (ref -16449.46–16450.54)
ERVN2 REF: 0.54
ERVN2ULN: ABNORMAL
FEF 25 75 LLN: 0.91
FEF 25 75 PRE REF: 100.5 %
FEF 25 75 REF: 2.3
FET100 CHG: -5.8 %
FEV05 LLN: 0.67
FEV05 REF: 1.52
FEV1 CHG: 0.1 %
FEV1 FVC LLN: 63
FEV1 FVC PRE REF: 103.7 %
FEV1 FVC REF: 78
FEV1 LLN: 1.35
FEV1 PRE REF: 110 %
FEV1 REF: 1.9
FEV1 VOL CHG: 0
FEV1FVCZSCORE: 0.37
FEV1ZSCORE: 0.59
FRCN2 LLN: 1.78
FRCN2 PRE REF: 66.7 %
FRCN2 REF: 2.6
FRCN2ULN: 3.43
FVC CHG: -5.1 %
FVC LLN: 1.76
FVC PRE REF: 105.1 %
FVC REF: 2.47
FVC VOL CHG: -0.13
FVCZSCORE: 0.28
ICN2REF: 1.7
IVC PRE: 2.57 L (ref 1.76–3.22)
IVC SINGLE BREATH LLN: 1.76
IVC SINGLE BREATH PRE REF: 104 %
IVC SINGLE BREATH REF: 2.47
IVCSINGLEBREATHULN: 3.22
LLN IC N2: -16448.3
PEF LLN: 3.25
PEF PRE REF: 62.6 %
PEF REF: 4.86
PHYSICIAN COMMENT: ABNORMAL
POST FEF 25 75: 2.61 L/S (ref 0.91–3.7)
POST FET 100: 6.21 SEC
POST FEV1 FVC: 84.89 % (ref 63.47–89.93)
POST FEV1: 2.09 L (ref 1.35–2.42)
POST FEV5: 1.59 L (ref 0.67–2.38)
POST FVC: 2.46 L (ref 1.76–3.22)
POST PEF: 3.5 L/S (ref 3.25–6.46)
PRE DLCO: 13.36 ML/(MIN*MMHG) (ref 13.43–24.9)
PRE FEF 25 75: 2.32 L/S (ref 0.91–3.7)
PRE FET 100: 6.6 SEC
PRE FEV05 REF: 95.5 %
PRE FEV1 FVC: 80.52 % (ref 63.47–89.93)
PRE FEV1: 2.09 L (ref 1.35–2.42)
PRE FEV5: 1.45 L (ref 0.67–2.38)
PRE FRC N2: 1.74 L (ref 1.78–3.43)
PRE FVC: 2.59 L (ref 1.76–3.22)
PRE IC N2: 2.31 L (ref -16448.3–16451.7)
PRE PEF: 3.04 L/S (ref 3.25–6.46)
PRE REF IC N2: 136.2 %
RVN2 LLN: 1.49
RVN2 PRE REF: 70.4 %
RVN2 PRE: 1.45 L (ref 1.49–2.64)
RVN2 REF: 2.07
RVN2TLCN2 LLN: 35.21
RVN2TLCN2 PRE REF: 80.2 %
RVN2TLCN2 PRE: 35.94 % (ref 35.21–54.39)
RVN2TLCN2 REF: 44.8
RVN2TLCN2ULN: 54.39
RVN2ULN: 2.64
TLCN2 LLN: 3.62
TLCN2 PRE REF: 87.9 %
TLCN2 PRE: 4.05 L (ref 3.62–5.59)
TLCN2 REF: 4.6
TLCN2ULN: 5.59
TLCN2ZSCORE: -0.93
ULN IC N2: ABNORMAL
VA PRE: 3.73 L (ref 4.45–4.45)
VA SINGLE BREATH LLN: 4.45
VA SINGLE BREATH PRE REF: 83.8 %
VA SINGLE BREATH REF: 4.45
VASINGLEBREATHULN: 4.45
VCMAXN2 LLN: 1.76
VCMAXN2 PRE REF: 105.1 %
VCMAXN2 PRE: 2.59 L (ref 1.76–3.22)
VCMAXN2 REF: 2.47
VCMAXN2ULN: 3.22

## 2025-04-22 NOTE — PROCEDURES
Christi Coreas is a 74 y.o.   female patient, who presents for a 6 minute walk test ordered by MD Bran.  The diagnosis is Shortness of Breath.  The patient's BMI is 26.1 kg/m2.  Predicted distance (lower limit of normal) is 272.06 meters.      Test Results:    The test was completed without stopping.  The total time walked was 360 seconds.  During walking, the patient reported:  No complaints. The patient used no assistive devices  during testing.     04/22/2025---------Distance: 396.24 meters (1300 feet)     O2 Sat % Supplemental Oxygen Heart Rate Blood Pressure David Scale   Pre-exercise  (Resting) 98 % Room Air 84 bpm 138/76 mmHg 0   During Exercise 96 % Room Air 98 bpm 154/72 mmHg 0   Post-exercise  (Recovery) 99 % Room Air  91 bpm   mmHg       Recovery Time: 60 seconds    Performing nurse/tech: Wendy MAYO      PREVIOUS STUDY:   The patient has not had a previous study.      CLINICAL INTERPRETATION:  Six minute walk distance is 396.24 meters (1300 feet) with no dyspnea.  During exercise, there was no significant desaturation while breathing room air.  Both blood pressure and heart rate remained stable with walking.  The patient did not report non-pulmonary symptoms during exercise.  The patient did complete the study, walking 360 seconds of the 360 second test.  No previous study performed.  Based upon age and body mass index, exercise capacity is normal.

## 2025-04-30 ENCOUNTER — CLINICAL SUPPORT (OUTPATIENT)
Dept: ENDOSCOPY | Facility: HOSPITAL | Age: 75
End: 2025-04-30
Payer: MEDICARE

## 2025-04-30 ENCOUNTER — TELEPHONE (OUTPATIENT)
Dept: ENDOSCOPY | Facility: HOSPITAL | Age: 75
End: 2025-04-30

## 2025-04-30 DIAGNOSIS — R05.9 COUGH, UNSPECIFIED TYPE: ICD-10-CM

## 2025-04-30 DIAGNOSIS — K21.9 GASTROESOPHAGEAL REFLUX DISEASE, UNSPECIFIED WHETHER ESOPHAGITIS PRESENT: ICD-10-CM

## 2025-04-30 DIAGNOSIS — R11.0 NAUSEA: ICD-10-CM

## 2025-05-08 ENCOUNTER — HOSPITAL ENCOUNTER (OUTPATIENT)
Dept: CARDIOLOGY | Facility: HOSPITAL | Age: 75
Discharge: HOME | End: 2025-05-08
Attending: EMERGENCY MEDICINE
Payer: MEDICARE

## 2025-05-08 DIAGNOSIS — R06.09 DOE (DYSPNEA ON EXERTION): ICD-10-CM

## 2025-05-08 LAB
ASCENDING AORTA: 2.8 CM
AV INDEX (PROSTH): 0.99
AV MEAN GRADIENT: 5 MMHG
AV PEAK GRADIENT: 7 MMHG
AV VALVE AREA BY VELOCITY RATIO: 2.8 CM²
AV VALVE AREA: 2.8 CM²
AV VELOCITY RATIO: 1
CV ECHO LV RWT: 0.51 CM
DOP CALC AO PEAK VEL: 1.3 M/S
DOP CALC AO VTI: 28.7 CM
DOP CALC LVOT AREA: 2.8 CM2
DOP CALC LVOT DIAMETER: 1.9 CM
DOP CALC LVOT PEAK VEL: 1.3 M/S
DOP CALC LVOT STROKE VOLUME: 80.5 CM3
DOP CALCLVOT PEAK VEL VTI: 28.4 CM
E WAVE DECELERATION TIME: 211 MSEC
E/A RATIO: 0.95
E/E' RATIO: 12 M/S
ECHO LV POSTERIOR WALL: 1 CM (ref 0.6–1.1)
FRACTIONAL SHORTENING: 38.5 % (ref 28–44)
INTERVENTRICULAR SEPTUM: 0.8 CM (ref 0.6–1.1)
IVC DIAMETER: 0.93 CM
LA MAJOR: 3.3 CM
LA MINOR: 3.2 CM
LA WIDTH: 2.9 CM
LEFT ATRIUM SIZE: 3.6 CM
LEFT ATRIUM VOLUME: 29 CM3
LEFT INTERNAL DIMENSION IN SYSTOLE: 2.4 CM (ref 2.1–4)
LEFT VENTRICLE DIASTOLIC VOLUME: 65 ML
LEFT VENTRICLE SYSTOLIC VOLUME: 20 ML
LEFT VENTRICULAR INTERNAL DIMENSION IN DIASTOLE: 3.9 CM (ref 3.5–6)
LEFT VENTRICULAR MASS: 105.3 G
LV LATERAL E/E' RATIO: 9.9 M/S
LV SEPTAL E/E' RATIO: 15.8 M/S
LVED V (TEICH): 64.98 ML
LVES V (TEICH): 19.75 ML
LVOT MG: 4.11 MMHG
LVOT MV: 0.98 CM/S
MV PEAK A VEL: 0.83 M/S
MV PEAK E VEL: 0.79 M/S
MV STENOSIS PRESSURE HALF TIME: 61.33 MS
MV VALVE AREA P 1/2 METHOD: 3.59 CM2
OHS CV RV/LV RATIO: 0.82 CM
PISA TR MAX VEL: 2.2 M/S
PULM VEIN S/D RATIO: 2.03
PV PEAK D VEL: 0.29 M/S
PV PEAK GRADIENT: 4 MMHG
PV PEAK S VEL: 0.59 M/S
PV PEAK VELOCITY: 0.95 M/S
RA MAJOR: 3.71 CM
RA PRESSURE ESTIMATED: 3 MMHG
RA WIDTH: 2.4 CM
RIGHT VENTRICLE DIASTOLIC BASEL DIMENSION: 3.2 CM
RIGHT VENTRICULAR END-DIASTOLIC DIMENSION: 3.23 CM
RV TB RVSP: 5 MMHG
RV TISSUE DOPPLER FREE WALL SYSTOLIC VELOCITY 1 (APICAL 4 CHAMBER VIEW): 12.72 CM/S
SINUS: 3.07 CM
STJ: 3.1 CM
TDI LATERAL: 0.08 M/S
TDI SEPTAL: 0.05 M/S
TDI: 0.07 M/S
TR MAX PG: 19 MMHG
TRICUSPID ANNULAR PLANE SYSTOLIC EXCURSION: 1.6 CM
TV REST PULMONARY ARTERY PRESSURE: 22 MMHG

## 2025-05-08 PROCEDURE — 93306 TTE W/DOPPLER COMPLETE: CPT | Mod: 26,,, | Performed by: INTERNAL MEDICINE

## 2025-05-08 PROCEDURE — 93306 TTE W/DOPPLER COMPLETE: CPT

## 2025-05-12 ENCOUNTER — OFFICE VISIT (OUTPATIENT)
Dept: FAMILY MEDICINE | Facility: CLINIC | Age: 75
End: 2025-05-12
Payer: MEDICARE

## 2025-05-12 VITALS
RESPIRATION RATE: 18 BRPM | SYSTOLIC BLOOD PRESSURE: 132 MMHG | BODY MASS INDEX: 26.17 KG/M2 | WEIGHT: 142.19 LBS | OXYGEN SATURATION: 97 % | TEMPERATURE: 98 F | HEART RATE: 85 BPM | DIASTOLIC BLOOD PRESSURE: 86 MMHG | HEIGHT: 62 IN

## 2025-05-12 DIAGNOSIS — Z00.00 ANNUAL PHYSICAL EXAM: Primary | ICD-10-CM

## 2025-05-12 DIAGNOSIS — Z13.220 LIPID SCREENING: ICD-10-CM

## 2025-05-12 DIAGNOSIS — M81.0 AGE-RELATED OSTEOPOROSIS WITHOUT CURRENT PATHOLOGICAL FRACTURE: Primary | ICD-10-CM

## 2025-05-12 DIAGNOSIS — E78.5 HYPERLIPIDEMIA, UNSPECIFIED HYPERLIPIDEMIA TYPE: ICD-10-CM

## 2025-05-12 DIAGNOSIS — R79.9 ABNORMAL FINDING OF BLOOD CHEMISTRY, UNSPECIFIED: ICD-10-CM

## 2025-05-12 DIAGNOSIS — I10 ESSENTIAL HYPERTENSION, BENIGN: ICD-10-CM

## 2025-05-12 DIAGNOSIS — K21.9 GASTROESOPHAGEAL REFLUX DISEASE, UNSPECIFIED WHETHER ESOPHAGITIS PRESENT: ICD-10-CM

## 2025-05-12 DIAGNOSIS — M81.0 AGE-RELATED OSTEOPOROSIS WITHOUT CURRENT PATHOLOGICAL FRACTURE: ICD-10-CM

## 2025-05-12 DIAGNOSIS — F41.9 ANXIETY: ICD-10-CM

## 2025-05-12 DIAGNOSIS — Z13.1 SCREENING FOR DIABETES MELLITUS: ICD-10-CM

## 2025-05-12 PROCEDURE — 99999 PR PBB SHADOW E&M-EST. PATIENT-LVL IV: CPT | Mod: PBBFAC,,,

## 2025-05-12 RX ORDER — ATORVASTATIN CALCIUM 40 MG/1
40 TABLET, FILM COATED ORAL NIGHTLY
Qty: 90 TABLET | Refills: 3 | Status: SHIPPED | OUTPATIENT
Start: 2025-05-12

## 2025-05-12 NOTE — PROGRESS NOTES
"  HPI     Chief Complaint:  Chief Complaint   Patient presents with    Annual Exam       Christi Coreas is a 74 y.o. female with multiple medical diagnoses as listed in the medical history and problem list that presents for Annual exam    HPI    History of Present Illness    CHIEF COMPLAINT:  Christi presents today for annual exam.    MUSCULOSKELETAL:  She reports lower back pain affecting mobility, including difficulty with walking, weight bearing, and reaching. She needs to pause beside her car after work due to weight bearing limitations. She performs stretches nightly for leg pain management.    RESPIRATORY:  She experiences shortness of breath with movement, particularly when leaning down and rising. A previous lung bruise from a fall has resolved per recent evaluation. She reports occasional stabbing pain in the left anterior chest.    GERD:  She experiences daily acid reflux with eating, describing food feeling "stuck" in her throat. She takes Prolosec which helps alleviate symptoms.    DIET:  She avoids fried foods, primarily using air fryer for food preparation. She reports occasional consumption of sweets, limiting intake to every few days.    MEDICATIONS:  She takes Lipitor for cholesterol management, Celexa as needed for stress management, vitamin D, calcium, and multivitamin supplements. Prolia injection is due June 9th.      ROS:  General: -fever, -chills, -fatigue, -weight gain, -weight loss  Eyes: -vision changes, -redness, -discharge  ENT: -ear pain, -nasal congestion, -sore throat  Cardiovascular: +chest pain, -palpitations, -lower extremity edema  Respiratory: +cough, -shortness of breath, +exertional dyspnea, +productive cough  Gastrointestinal: -abdominal pain, -nausea, -vomiting, -diarrhea, -constipation, -blood in stool, +indigestion  Genitourinary: -dysuria, -hematuria, -frequency  Musculoskeletal: -joint pain, -muscle pain, +back pain, +difficulty walking, +pain with movement, +limb " pain  Skin: -rash, -lesion  Neurological: -headache, -dizziness, -numbness, -tingling  Psychiatric: -anxiety, -depression, -sleep difficulty  Allergic: +allergic reactions             Assessment & Plan     Assessment & Plan    Reviewed recent echocardiogram results, noting trace regurgitation in 2 valves but overall normal structure and pressures.  Shortness of breath and chest discomfort likely related to stomach issues rather than cardiac, given normal echo results.  Ongoing reflux symptoms may require further evaluation with endoscopy/colonoscopy.  Back pain recommended for conservative management with stretches and heat application for 20 minutes.  Elevated cholesterol from January labs, continued current statin therapy.        HEART VALVE DISORDERS:  - Reviewed echocardiogram results showing trace regurgitation in 2 heart valves with normal heart structure and pressure.  - Explained the significance of these findings to patient.    LOW BACK PAIN:  - Christi reports lower back pain, sometimes severe enough to affect walking.  - Recommend heat pad and stretches for pain management.      SHORTNESS OF BREATH:  - Christi experiences shortness of breath when leaning down and standing up.    CHEST PAIN:  - Christi reports occasional stabbing pain on the left side of the chest.    FOLLOW-UP:  - Follow up in November for annual exam with Dr. López.         Problem List Items Addressed This Visit       Anxiety  ANXIETY DISORDER:  - Continued Celexa (citalopram) as needed for stress management.    Essential hypertension, benign  Blood pressure is in normal range and stable today  Continue current medication regimen,    Advised to continue with low-sodium diet, regular cardiovascular exercises, portion control       Age-related osteoporosis without current pathological fracture    Relevant Medications    denosumab (PROLIA) 60 mg/mL Syrg  Continues with Prolia      Annual physical exam - Primary    Relevant Orders    CBC Auto  Differential    Comprehensive Metabolic Panel  GENERAL HEALTH MAINTENANCE:  - Discussed importance of bone health maintenance through supplementation.  - Continued vitamin D, calcium, and multivitamin.  - Follow up on June 12th for Prolia injection.  - Recommend lifestyle modifications including using air fryer for healthier cooking, minimizing sweets, and scheduling dental and eye doctor appointments.  - Ordered comprehensive annual lab work including CBC, electrolytes, renal function, liver function, and HbA1c.      Screening for diabetes mellitus    Relevant Orders    Hemoglobin A1C  Care gaps screening    Lipid screening    Relevant Orders    Lipid Panel  Care gaps screen    Abnormal finding of blood chemistry, unspecified    Relevant Orders    CBC Auto Differential    Hemoglobin A1C    Lipid Panel  Labs were annual    Hyperlipidemia    Relevant Medications    atorvastatin (LIPITOR) 40 MG tablet    HYPERLIPIDEMIA:  - Monitored elevated cholesterol from January.  - Continued Lipitor (atorvastatin) with refill provided for 1 year.  - Ordered lipid panel as part of annual lab work.     GERD (gastrooesophageal reflux disease)  GASTROESOPHAGEAL REFLUX DISEASE:  - Continued Protonix (pantoprazole) for acid reflux symptoms that occur sometimes when eating.  - Noted that order for colonoscopy and endoscopy is still active.  - Leres instructed to contact office to schedule these procedures at their convenience, possibly in [place].    --------------------------------------------      Health Maintenance:  Health Maintenance         Date Due Completion Date    DEXA Scan 03/28/2026 3/28/2024    Hemoglobin A1c (Prediabetes) 05/15/2026 5/15/2025    TETANUS VACCINE 09/09/2026 9/9/2016    Override on 9/9/2016: Done (fax from [facility])    Lipid Panel 05/15/2030 5/15/2025            Health maintenance reviewed and Advised patient on the importance of completing overdue health maintenance items    Follow Up:  Follow up if  "symptoms worsen or fail to improve.    Exam     Review of Systems:  (as noted above)  Review of Systems    Physical Exam:   Physical Exam  Vitals:    05/12/25 1350   BP: 132/86   BP Location: Right arm   Patient Position: Sitting   Pulse: 85   Resp: 18   Temp: 98.2 °F (36.8 °C)   TempSrc: Oral   SpO2: 97%   Weight: 64.5 kg (142 lb 3.2 oz)   Height: 5' 2" (1.575 m)      Body mass index is 26.01 kg/m².    Physical Exam    General: No acute distress. Well-developed. Well-nourished.  Eyes: EOMI. Sclerae anicteric.  HENT: Normocephalic. Atraumatic. Nares patent.   Cardiovascular: Regular rate. Regular rhythm. No murmurs. No rubs. No gallops. Normal S1, S2.  Respiratory: Normal respiratory effort. Clear to auscultation bilaterally. No rales. No rhonchi. No wheezing.  Musculoskeletal: No  obvious deformity.  Extremities: No lower extremity edema.  Neurological: Alert & oriented x3. No slurred speech. Normal gait.  Psychiatric: Normal mood. Normal affect. Good insight. Good judgment.  Skin: Warm. Dry. No rash.           History     Past Medical History:  Past Medical History:   Diagnosis Date    Degenerative disc disease     lumbar    Glucose intolerance (impaired glucose tolerance)     Hypertension     LUQ abdominal pain 04/16/2015    Non-recurrent acute serous otitis media of right ear 11/29/2023       Past Surgical History:  Past Surgical History:   Procedure Laterality Date    APPENDECTOMY      HYSTERECTOMY      OVARIAN CYST SURGERY         Social History:  Social History[1]    Family History:  Family History   Problem Relation Name Age of Onset    Cancer Mother      Hypertension Father      Cancer Sister      Diabetes Sister      Breast cancer Sister      Breast cancer Maternal Aunt         Allergies and Medications: (updated and reviewed)  Review of patient's allergies indicates:   Allergen Reactions    Aspirin     Nexium [esomeprazole magnesium] Other (See Comments)     "bad pain"    Indomethacin Rash     Current " Medications[2]    Patient Care Team:  Katarzyna López MD as PCP - General (Internal Medicine)         - The patient is given an After Visit Summary that lists all medications with directions, allergies, education, orders placed during this encounter and follow-up instructions.      - I have reviewed the patient's medical information including past medical, family, and social history sections including the medications and allergies.      - We discussed the patient's current medications.     This note was created by combination of typed  and MModal dictation.  Transcription errors may be present.  If there are any questions, please contact me.     This note was generated with the assistance of ambient listening technology. Verbal consent was obtained by the patient and accompanying visitor(s) for the recording of patient appointment to facilitate this note. I attest to having reviewed and edited the generated note for accuracy, though some syntax or spelling errors may persist. Please contact the author of this note for any clarification.          Ping Velazco PA-C                      [1]   Social History  Socioeconomic History    Marital status:    Tobacco Use    Smoking status: Never    Smokeless tobacco: Never   Substance and Sexual Activity    Alcohol use: Yes     Comment: occacionally wine    Drug use: No     Social Drivers of Health     Financial Resource Strain: High Risk (1/3/2025)    Overall Financial Resource Strain (CARDIA)     Difficulty of Paying Living Expenses: Very hard   Food Insecurity: No Food Insecurity (1/3/2025)    Hunger Vital Sign     Worried About Running Out of Food in the Last Year: Never true     Ran Out of Food in the Last Year: Never true   Transportation Needs: No Transportation Needs (1/3/2025)    PRAPARE - Transportation     Lack of Transportation (Medical): No     Lack of Transportation (Non-Medical): No   Physical Activity: Insufficiently Active  (1/3/2025)    Exercise Vital Sign     Days of Exercise per Week: 5 days     Minutes of Exercise per Session: 10 min   Stress: No Stress Concern Present (1/3/2025)    [employer] - Occupational Stress Questionnaire     Feeling of Stress : Not at all   Housing Stability: Unknown (1/3/2025)    Housing Stability Vital Sign     Unable to Pay for Housing in the Last Year: No     Homeless in the Last Year: No   [2]   Current Outpatient Medications   Medication Sig Dispense Refill    albuterol (VENTOLIN HFA) 90 mcg/actuation inhaler Inhale 2 puffs into the lungs every 6 (six) hours as needed for Wheezing. Rescue 18 g 5    amLODIPine (NORVASC) 5 MG tablet Take 1 tablet (5 mg total) by mouth once daily. 90 tablet 3    benazepriL (LOTENSIN) 40 MG tablet Take 1 tablet (40 mg total) by mouth once daily. 90 tablet 3    citalopram (CELEXA) 40 MG tablet Take 1 tablet by mouth once daily 90 tablet 0    gabapentin (NEURONTIN) 300 MG capsule Take 1 capsule (300 mg total) by mouth every evening. 90 capsule 3    levocetirizine (XYZAL) 5 MG tablet Take 1 tablet (5 mg total) by mouth every evening. 90 tablet 3    omeprazole (PRILOSEC) 40 MG capsule Take 1 capsule (40 mg total) by mouth once daily. 90 capsule 3    AREXVY, PF, 120 mcg/0.5 mL SusR vaccine  (Patient not taking: Reported on 5/12/2025)      atorvastatin (LIPITOR) 40 MG tablet Take 1 tablet (40 mg total) by mouth every evening. 90 tablet 3    TWINRIX, PF, 720 LIMA unit- 20 mcg/mL Syrg suspension  (Patient not taking: Reported on 5/12/2025)       No current facility-administered medications for this visit.

## 2025-05-13 ENCOUNTER — TELEPHONE (OUTPATIENT)
Dept: PHARMACY | Facility: CLINIC | Age: 75
End: 2025-05-13
Payer: MEDICARE

## 2025-05-13 ENCOUNTER — RESULTS FOLLOW-UP (OUTPATIENT)
Dept: PHARMACY | Facility: CLINIC | Age: 75
End: 2025-05-13

## 2025-05-13 NOTE — LETTER
May 13, 2025    Arvindantonio Coreas  [address] [place] [address]           Dear Mrs. Coreas,      My name is Sea Mendez .  I am reaching out on behalf of [facility] Patient Assistance Team after receiving a referral from your Provider inquiring about assistance with your medication.  We have reviewed your current medication list and/or insurance status. Unfortunately, The Pharmacy Patient Assistance Team is unable to assist you with PAP enrollment at this time for the following reason(s):      The Pharmacy Patient Assistance Team does not assist with specialty medication. Please send a new prescription to [facility] to initiate assistance and follow up with the Specialty Assistance Team.  You may follow-up with your provider at your convenience regarding next steps.             Sincerely,   Sea Mendez @[phone]  Pharmacy Patient Assistance  [address] [place] [address]  [address] [place] [id number]  Fax: [phone]  Email: [email or web address]

## 2025-05-13 NOTE — TELEPHONE ENCOUNTER
We are currently unable to assist with PAP enrollment for the following reason(s):      The Pharmacy Patient Assistance Team does not assist with specialty medication. Please send a new prescription to [facility] to initiate assistance and follow up with the Specialty Assistance Team.              Sea Mendez @[phone]  Pharmacy Patient Assistance Team

## 2025-05-15 ENCOUNTER — LAB VISIT (OUTPATIENT)
Dept: LAB | Facility: HOSPITAL | Age: 75
End: 2025-05-15
Payer: MEDICARE

## 2025-05-15 DIAGNOSIS — R79.9 ABNORMAL FINDING OF BLOOD CHEMISTRY, UNSPECIFIED: ICD-10-CM

## 2025-05-15 DIAGNOSIS — Z13.1 SCREENING FOR DIABETES MELLITUS: ICD-10-CM

## 2025-05-15 DIAGNOSIS — Z00.00 ANNUAL PHYSICAL EXAM: ICD-10-CM

## 2025-05-15 DIAGNOSIS — Z13.220 LIPID SCREENING: ICD-10-CM

## 2025-05-15 LAB
ABSOLUTE EOSINOPHIL (OHS): 0.18 K/UL
ABSOLUTE MONOCYTE (OHS): 0.61 K/UL (ref 0.3–1)
ABSOLUTE NEUTROPHIL COUNT (OHS): 3.71 K/UL (ref 1.8–7.7)
ALBUMIN SERPL BCP-MCNC: 3.8 G/DL (ref 3.5–5.2)
ALP SERPL-CCNC: 69 UNIT/L (ref 40–150)
ALT SERPL W/O P-5'-P-CCNC: 20 UNIT/L (ref 10–44)
ANION GAP (OHS): 11 MMOL/L (ref 8–16)
AST SERPL-CCNC: 33 UNIT/L (ref 11–45)
BASOPHILS # BLD AUTO: 0.07 K/UL
BASOPHILS NFR BLD AUTO: 1.1 %
BILIRUB SERPL-MCNC: 0.2 MG/DL (ref 0.1–1)
BUN SERPL-MCNC: 12 MG/DL (ref 8–23)
CALCIUM SERPL-MCNC: 8.7 MG/DL (ref 8.7–10.5)
CHLORIDE SERPL-SCNC: 108 MMOL/L (ref 95–110)
CHOLEST SERPL-MCNC: 245 MG/DL (ref 120–199)
CHOLEST/HDLC SERPL: 4.3 {RATIO} (ref 2–5)
CO2 SERPL-SCNC: 24 MMOL/L (ref 23–29)
CREAT SERPL-MCNC: 0.7 MG/DL (ref 0.5–1.4)
EAG (OHS): 134 MG/DL (ref 68–131)
ERYTHROCYTE [DISTWIDTH] IN BLOOD BY AUTOMATED COUNT: 13.2 % (ref 11.5–14.5)
GFR SERPLBLD CREATININE-BSD FMLA CKD-EPI: >60 ML/MIN/1.73/M2
GLUCOSE SERPL-MCNC: 84 MG/DL (ref 70–110)
HBA1C MFR BLD: 6.3 % (ref 4–5.6)
HCT VFR BLD AUTO: 34.3 % (ref 37–48.5)
HDLC SERPL-MCNC: 57 MG/DL (ref 40–75)
HDLC SERPL: 23.3 % (ref 20–50)
HGB BLD-MCNC: 11.2 GM/DL (ref 12–16)
IMM GRANULOCYTES # BLD AUTO: 0.02 K/UL (ref 0–0.04)
IMM GRANULOCYTES NFR BLD AUTO: 0.3 % (ref 0–0.5)
LDLC SERPL CALC-MCNC: 153 MG/DL (ref 63–159)
LYMPHOCYTES # BLD AUTO: 1.61 K/UL (ref 1–4.8)
MCH RBC QN AUTO: 29.3 PG (ref 27–31)
MCHC RBC AUTO-ENTMCNC: 32.7 G/DL (ref 32–36)
MCV RBC AUTO: 90 FL (ref 82–98)
NONHDLC SERPL-MCNC: 188 MG/DL
NUCLEATED RBC (/100WBC) (OHS): 0 /100 WBC
PLATELET # BLD AUTO: 210 K/UL (ref 150–450)
PMV BLD AUTO: 11 FL (ref 9.2–12.9)
POTASSIUM SERPL-SCNC: 3.9 MMOL/L (ref 3.5–5.1)
PROT SERPL-MCNC: 7.1 GM/DL (ref 6–8.4)
RBC # BLD AUTO: 3.82 M/UL (ref 4–5.4)
RELATIVE EOSINOPHIL (OHS): 2.9 %
RELATIVE LYMPHOCYTE (OHS): 26 % (ref 18–48)
RELATIVE MONOCYTE (OHS): 9.8 % (ref 4–15)
RELATIVE NEUTROPHIL (OHS): 59.9 % (ref 38–73)
SODIUM SERPL-SCNC: 143 MMOL/L (ref 136–145)
TRIGL SERPL-MCNC: 175 MG/DL (ref 30–150)
WBC # BLD AUTO: 6.2 K/UL (ref 3.9–12.7)

## 2025-05-15 PROCEDURE — 36415 COLL VENOUS BLD VENIPUNCTURE: CPT | Mod: PO

## 2025-05-15 PROCEDURE — 80061 LIPID PANEL: CPT

## 2025-05-15 PROCEDURE — 85025 COMPLETE CBC W/AUTO DIFF WBC: CPT

## 2025-05-15 PROCEDURE — 83036 HEMOGLOBIN GLYCOSYLATED A1C: CPT

## 2025-05-15 PROCEDURE — 82565 ASSAY OF CREATININE: CPT

## 2025-06-12 ENCOUNTER — CLINICAL SUPPORT (OUTPATIENT)
Dept: FAMILY MEDICINE | Facility: CLINIC | Age: 75
End: 2025-06-12
Payer: MEDICARE

## 2025-06-12 DIAGNOSIS — M81.0 AGE-RELATED OSTEOPOROSIS WITHOUT CURRENT PATHOLOGICAL FRACTURE: Primary | ICD-10-CM

## 2025-06-12 PROCEDURE — 96372 THER/PROPH/DIAG INJ SC/IM: CPT | Mod: S$GLB,,, | Performed by: INTERNAL MEDICINE

## 2025-06-12 NOTE — PROGRESS NOTES
Pt tolerated injection of prolia 60mg to left posterior arm without difficulty; no adverse reaction noted    
09-Dec-2021